# Patient Record
Sex: MALE | Race: WHITE | Employment: OTHER | URBAN - METROPOLITAN AREA
[De-identification: names, ages, dates, MRNs, and addresses within clinical notes are randomized per-mention and may not be internally consistent; named-entity substitution may affect disease eponyms.]

---

## 2019-06-06 ENCOUNTER — HOSPITAL ENCOUNTER (OUTPATIENT)
Dept: GENERAL RADIOLOGY | Age: 84
Discharge: HOME | End: 2019-06-08
Payer: MEDICARE

## 2019-06-06 DIAGNOSIS — R91.8 LUNG INFILTRATE: ICD-10-CM

## 2019-06-06 PROCEDURE — 71046 X-RAY EXAM CHEST 2 VIEWS: CPT

## 2019-12-28 ENCOUNTER — APPOINTMENT (OUTPATIENT)
Dept: GENERAL RADIOLOGY | Age: 84
DRG: 683 | End: 2019-12-28
Payer: MEDICARE

## 2019-12-28 ENCOUNTER — APPOINTMENT (OUTPATIENT)
Dept: CT IMAGING | Age: 84
DRG: 683 | End: 2019-12-28
Payer: MEDICARE

## 2019-12-28 ENCOUNTER — APPOINTMENT (OUTPATIENT)
Dept: ULTRASOUND IMAGING | Age: 84
DRG: 683 | End: 2019-12-28
Payer: MEDICARE

## 2019-12-28 ENCOUNTER — HOSPITAL ENCOUNTER (INPATIENT)
Age: 84
LOS: 3 days | Discharge: NURSING, REHAB OR LONG-TERM CARE | DRG: 683 | End: 2019-12-31
Attending: INTERNAL MEDICINE | Admitting: INTERNAL MEDICINE
Payer: MEDICARE

## 2019-12-28 PROBLEM — N17.9 ACUTE RENAL FAILURE (ARF) (HCC): Status: ACTIVE | Noted: 2019-12-28

## 2019-12-28 LAB
ABO/RH: NORMAL
ALBUMIN SERPL-MCNC: 4.2 G/DL (ref 3.5–4.6)
ALP BLD-CCNC: 117 U/L (ref 35–104)
ALT SERPL-CCNC: 28 U/L (ref 0–41)
ANION GAP SERPL CALCULATED.3IONS-SCNC: 18 MEQ/L (ref 9–15)
ANTIBODY SCREEN: NORMAL
APTT: 29.8 SEC (ref 24.4–36.8)
AST SERPL-CCNC: 41 U/L (ref 0–40)
BACTERIA: NEGATIVE /HPF
BASOPHILS ABSOLUTE: 0.1 K/UL (ref 0–0.2)
BASOPHILS RELATIVE PERCENT: 0.4 %
BILIRUB SERPL-MCNC: 0.6 MG/DL (ref 0.2–0.7)
BILIRUBIN URINE: NEGATIVE
BLOOD, URINE: ABNORMAL
BUN BLDV-MCNC: 39 MG/DL (ref 8–23)
CALCIUM SERPL-MCNC: 9.4 MG/DL (ref 8.5–9.9)
CHLORIDE BLD-SCNC: 100 MEQ/L (ref 95–107)
CLARITY: CLEAR
CO2: 20 MEQ/L (ref 20–31)
COLOR: YELLOW
CREAT SERPL-MCNC: 1.34 MG/DL (ref 0.7–1.2)
EKG ATRIAL RATE: 147 BPM
EKG ATRIAL RATE: 166 BPM
EKG ATRIAL RATE: 47 BPM
EKG ATRIAL RATE: 63 BPM
EKG P AXIS: 48 DEGREES
EKG P-R INTERVAL: 224 MS
EKG Q-T INTERVAL: 300 MS
EKG Q-T INTERVAL: 316 MS
EKG Q-T INTERVAL: 334 MS
EKG Q-T INTERVAL: 422 MS
EKG QRS DURATION: 104 MS
EKG QRS DURATION: 108 MS
EKG QRS DURATION: 164 MS
EKG QRS DURATION: 92 MS
EKG QTC CALCULATION (BAZETT): 431 MS
EKG QTC CALCULATION (BAZETT): 467 MS
EKG QTC CALCULATION (BAZETT): 509 MS
EKG QTC CALCULATION (BAZETT): 522 MS
EKG R AXIS: -26 DEGREES
EKG R AXIS: -41 DEGREES
EKG R AXIS: -46 DEGREES
EKG R AXIS: -49 DEGREES
EKG T AXIS: 117 DEGREES
EKG T AXIS: 128 DEGREES
EKG T AXIS: 49 DEGREES
EKG T AXIS: 52 DEGREES
EKG VENTRICULAR RATE: 146 BPM
EKG VENTRICULAR RATE: 147 BPM
EKG VENTRICULAR RATE: 156 BPM
EKG VENTRICULAR RATE: 63 BPM
EOSINOPHILS ABSOLUTE: 0 K/UL (ref 0–0.7)
EOSINOPHILS RELATIVE PERCENT: 0.1 %
EPITHELIAL CELLS, UA: ABNORMAL /HPF (ref 0–5)
GFR AFRICAN AMERICAN: 59.7
GFR NON-AFRICAN AMERICAN: 49.3
GLOBULIN: 3.8 G/DL (ref 2.3–3.5)
GLUCOSE BLD-MCNC: 155 MG/DL (ref 70–99)
GLUCOSE URINE: NEGATIVE MG/DL
HCT VFR BLD CALC: 30.8 % (ref 42–52)
HEMOGLOBIN: 10.5 G/DL (ref 14–18)
HYALINE CASTS: ABNORMAL /HPF (ref 0–5)
INR BLD: 1.2
KETONES, URINE: NEGATIVE MG/DL
LACTIC ACID: 2.7 MMOL/L (ref 0.5–2.2)
LEUKOCYTE ESTERASE, URINE: ABNORMAL
LYMPHOCYTES ABSOLUTE: 0.9 K/UL (ref 1–4.8)
LYMPHOCYTES RELATIVE PERCENT: 5.3 %
MAGNESIUM: 2 MG/DL (ref 1.7–2.4)
MCH RBC QN AUTO: 35 PG (ref 27–31.3)
MCHC RBC AUTO-ENTMCNC: 34 % (ref 33–37)
MCV RBC AUTO: 102.9 FL (ref 80–100)
MONOCYTES ABSOLUTE: 0.9 K/UL (ref 0.2–0.8)
MONOCYTES RELATIVE PERCENT: 5.1 %
NEUTROPHILS ABSOLUTE: 15.2 K/UL (ref 1.4–6.5)
NEUTROPHILS RELATIVE PERCENT: 89.1 %
NITRITE, URINE: POSITIVE
PDW BLD-RTO: 14.4 % (ref 11.5–14.5)
PH UA: 5 (ref 5–9)
PLATELET # BLD: 236 K/UL (ref 130–400)
POTASSIUM SERPL-SCNC: 4.3 MEQ/L (ref 3.4–4.9)
PROTEIN UA: 30 MG/DL
PROTHROMBIN TIME: 15.2 SEC (ref 12.3–14.9)
RBC # BLD: 2.99 M/UL (ref 4.7–6.1)
SODIUM BLD-SCNC: 138 MEQ/L (ref 135–144)
SPECIFIC GRAVITY UA: 1.02 (ref 1–1.03)
TOTAL PROTEIN: 8 G/DL (ref 6.3–8)
TROPONIN: 0.05 NG/ML (ref 0–0.01)
TSH SERPL DL<=0.05 MIU/L-ACNC: 2.46 UIU/ML (ref 0.44–3.86)
URINE REFLEX TO CULTURE: YES
UROBILINOGEN, URINE: 0.2 E.U./DL
WBC # BLD: 17.1 K/UL (ref 4.8–10.8)
WBC UA: ABNORMAL /HPF (ref 0–5)
YEAST: PRESENT

## 2019-12-28 PROCEDURE — 2580000003 HC RX 258: Performed by: PHYSICIAN ASSISTANT

## 2019-12-28 PROCEDURE — 80053 COMPREHEN METABOLIC PANEL: CPT

## 2019-12-28 PROCEDURE — 85025 COMPLETE CBC W/AUTO DIFF WBC: CPT

## 2019-12-28 PROCEDURE — 72131 CT LUMBAR SPINE W/O DYE: CPT

## 2019-12-28 PROCEDURE — 6830039000 HC L3 TRAUMA ALERT

## 2019-12-28 PROCEDURE — 81001 URINALYSIS AUTO W/SCOPE: CPT

## 2019-12-28 PROCEDURE — 86900 BLOOD TYPING SEROLOGIC ABO: CPT

## 2019-12-28 PROCEDURE — 93005 ELECTROCARDIOGRAM TRACING: CPT | Performed by: EMERGENCY MEDICINE

## 2019-12-28 PROCEDURE — 70450 CT HEAD/BRAIN W/O DYE: CPT

## 2019-12-28 PROCEDURE — 2580000003 HC RX 258: Performed by: PERSONAL EMERGENCY RESPONSE ATTENDANT

## 2019-12-28 PROCEDURE — 86901 BLOOD TYPING SEROLOGIC RH(D): CPT

## 2019-12-28 PROCEDURE — 84443 ASSAY THYROID STIM HORMONE: CPT

## 2019-12-28 PROCEDURE — 2580000003 HC RX 258: Performed by: INTERNAL MEDICINE

## 2019-12-28 PROCEDURE — 72125 CT NECK SPINE W/O DYE: CPT

## 2019-12-28 PROCEDURE — 83735 ASSAY OF MAGNESIUM: CPT

## 2019-12-28 PROCEDURE — 6360000002 HC RX W HCPCS: Performed by: PHYSICIAN ASSISTANT

## 2019-12-28 PROCEDURE — 85730 THROMBOPLASTIN TIME PARTIAL: CPT

## 2019-12-28 PROCEDURE — 6360000002 HC RX W HCPCS: Performed by: INTERNAL MEDICINE

## 2019-12-28 PROCEDURE — 99285 EMERGENCY DEPT VISIT HI MDM: CPT

## 2019-12-28 PROCEDURE — 2500000003 HC RX 250 WO HCPCS: Performed by: PERSONAL EMERGENCY RESPONSE ATTENDANT

## 2019-12-28 PROCEDURE — 6360000004 HC RX CONTRAST MEDICATION: Performed by: PERSONAL EMERGENCY RESPONSE ATTENDANT

## 2019-12-28 PROCEDURE — 85610 PROTHROMBIN TIME: CPT

## 2019-12-28 PROCEDURE — 74177 CT ABD & PELVIS W/CONTRAST: CPT

## 2019-12-28 PROCEDURE — 93005 ELECTROCARDIOGRAM TRACING: CPT | Performed by: PERSONAL EMERGENCY RESPONSE ATTENDANT

## 2019-12-28 PROCEDURE — 72128 CT CHEST SPINE W/O DYE: CPT

## 2019-12-28 PROCEDURE — 96375 TX/PRO/DX INJ NEW DRUG ADDON: CPT

## 2019-12-28 PROCEDURE — 2000000000 HC ICU R&B

## 2019-12-28 PROCEDURE — 2580000003 HC RX 258

## 2019-12-28 PROCEDURE — 71275 CT ANGIOGRAPHY CHEST: CPT

## 2019-12-28 PROCEDURE — 87086 URINE CULTURE/COLONY COUNT: CPT

## 2019-12-28 PROCEDURE — 96365 THER/PROPH/DIAG IV INF INIT: CPT

## 2019-12-28 PROCEDURE — 96361 HYDRATE IV INFUSION ADD-ON: CPT

## 2019-12-28 PROCEDURE — 71045 X-RAY EXAM CHEST 1 VIEW: CPT

## 2019-12-28 PROCEDURE — 2580000003 HC RX 258: Performed by: EMERGENCY MEDICINE

## 2019-12-28 PROCEDURE — 96376 TX/PRO/DX INJ SAME DRUG ADON: CPT

## 2019-12-28 PROCEDURE — 83605 ASSAY OF LACTIC ACID: CPT

## 2019-12-28 PROCEDURE — 86850 RBC ANTIBODY SCREEN: CPT

## 2019-12-28 PROCEDURE — 99223 1ST HOSP IP/OBS HIGH 75: CPT | Performed by: SURGERY

## 2019-12-28 PROCEDURE — 84484 ASSAY OF TROPONIN QUANT: CPT

## 2019-12-28 PROCEDURE — 6360000002 HC RX W HCPCS: Performed by: PERSONAL EMERGENCY RESPONSE ATTENDANT

## 2019-12-28 PROCEDURE — 36415 COLL VENOUS BLD VENIPUNCTURE: CPT

## 2019-12-28 PROCEDURE — 72170 X-RAY EXAM OF PELVIS: CPT

## 2019-12-28 RX ORDER — SODIUM CHLORIDE 9 MG/ML
INJECTION, SOLUTION INTRAVENOUS CONTINUOUS
Status: DISCONTINUED | OUTPATIENT
Start: 2019-12-28 | End: 2019-12-30

## 2019-12-28 RX ORDER — METOPROLOL TARTRATE 5 MG/5ML
5 INJECTION INTRAVENOUS ONCE
Status: COMPLETED | OUTPATIENT
Start: 2019-12-28 | End: 2019-12-28

## 2019-12-28 RX ORDER — ONDANSETRON 2 MG/ML
4 INJECTION INTRAMUSCULAR; INTRAVENOUS ONCE
Status: COMPLETED | OUTPATIENT
Start: 2019-12-28 | End: 2019-12-28

## 2019-12-28 RX ORDER — 0.9 % SODIUM CHLORIDE 0.9 %
1000 INTRAVENOUS SOLUTION INTRAVENOUS ONCE
Status: COMPLETED | OUTPATIENT
Start: 2019-12-28 | End: 2019-12-28

## 2019-12-28 RX ORDER — 0.9 % SODIUM CHLORIDE 0.9 %
30 INTRAVENOUS SOLUTION INTRAVENOUS ONCE
Status: COMPLETED | OUTPATIENT
Start: 2019-12-28 | End: 2019-12-28

## 2019-12-28 RX ORDER — LEVOTHYROXINE SODIUM 0.1 MG/1
100 TABLET ORAL DAILY
COMMUNITY

## 2019-12-28 RX ORDER — ADENOSINE 3 MG/ML
6 INJECTION, SOLUTION INTRAVENOUS ONCE
Status: COMPLETED | OUTPATIENT
Start: 2019-12-28 | End: 2019-12-28

## 2019-12-28 RX ORDER — FUROSEMIDE 10 MG/ML
60 INJECTION INTRAMUSCULAR; INTRAVENOUS ONCE
Status: DISCONTINUED | OUTPATIENT
Start: 2019-12-28 | End: 2019-12-29

## 2019-12-28 RX ORDER — 0.9 % SODIUM CHLORIDE 0.9 %
500 INTRAVENOUS SOLUTION INTRAVENOUS ONCE
Status: COMPLETED | OUTPATIENT
Start: 2019-12-28 | End: 2019-12-28

## 2019-12-28 RX ORDER — LORAZEPAM 2 MG/ML
1 INJECTION INTRAMUSCULAR ONCE
Status: COMPLETED | OUTPATIENT
Start: 2019-12-28 | End: 2019-12-28

## 2019-12-28 RX ORDER — FENTANYL CITRATE 50 UG/ML
50 INJECTION, SOLUTION INTRAMUSCULAR; INTRAVENOUS ONCE
Status: COMPLETED | OUTPATIENT
Start: 2019-12-28 | End: 2019-12-28

## 2019-12-28 RX ORDER — LEVOTHYROXINE SODIUM 0.1 MG/1
100 TABLET ORAL DAILY
Status: DISCONTINUED | OUTPATIENT
Start: 2019-12-28 | End: 2019-12-31 | Stop reason: HOSPADM

## 2019-12-28 RX ORDER — SODIUM CHLORIDE 0.9 % (FLUSH) 0.9 %
10 SYRINGE (ML) INJECTION EVERY 12 HOURS SCHEDULED
Status: DISCONTINUED | OUTPATIENT
Start: 2019-12-28 | End: 2019-12-31 | Stop reason: HOSPADM

## 2019-12-28 RX ORDER — ONDANSETRON 2 MG/ML
4 INJECTION INTRAMUSCULAR; INTRAVENOUS EVERY 6 HOURS PRN
Status: DISCONTINUED | OUTPATIENT
Start: 2019-12-28 | End: 2019-12-31 | Stop reason: HOSPADM

## 2019-12-28 RX ORDER — SODIUM CHLORIDE 0.9 % (FLUSH) 0.9 %
10 SYRINGE (ML) INJECTION PRN
Status: DISCONTINUED | OUTPATIENT
Start: 2019-12-28 | End: 2019-12-31 | Stop reason: HOSPADM

## 2019-12-28 RX ORDER — MAGNESIUM HYDROXIDE 1200 MG/15ML
LIQUID ORAL
Status: COMPLETED
Start: 2019-12-28 | End: 2019-12-28

## 2019-12-28 RX ORDER — VALSARTAN 80 MG/1
80 TABLET ORAL DAILY
Status: ON HOLD | COMMUNITY
End: 2020-01-03 | Stop reason: HOSPADM

## 2019-12-28 RX ORDER — 0.9 % SODIUM CHLORIDE 0.9 %
1000 INTRAVENOUS SOLUTION INTRAVENOUS ONCE
Status: DISCONTINUED | OUTPATIENT
Start: 2019-12-28 | End: 2019-12-31 | Stop reason: HOSPADM

## 2019-12-28 RX ADMIN — ADENOSINE 6 MG: 3 INJECTION INTRAVENOUS at 04:41

## 2019-12-28 RX ADMIN — ENOXAPARIN SODIUM 30 MG: 30 INJECTION SUBCUTANEOUS at 14:31

## 2019-12-28 RX ADMIN — SODIUM CHLORIDE: 9 INJECTION, SOLUTION INTRAVENOUS at 12:00

## 2019-12-28 RX ADMIN — ADENOSINE 6 MG: 3 INJECTION, SOLUTION INTRAVENOUS at 03:53

## 2019-12-28 RX ADMIN — LORAZEPAM 1 MG: 2 INJECTION INTRAMUSCULAR; INTRAVENOUS at 04:58

## 2019-12-28 RX ADMIN — SODIUM CHLORIDE 500 ML: 9 INJECTION, SOLUTION INTRAVENOUS at 08:22

## 2019-12-28 RX ADMIN — SODIUM CHLORIDE 2040 ML: 9 INJECTION, SOLUTION INTRAVENOUS at 09:53

## 2019-12-28 RX ADMIN — METOPROLOL TARTRATE 5 MG: 5 INJECTION INTRAVENOUS at 04:58

## 2019-12-28 RX ADMIN — CEFTRIAXONE SODIUM 1 G: 1 INJECTION, POWDER, FOR SOLUTION INTRAMUSCULAR; INTRAVENOUS at 08:46

## 2019-12-28 RX ADMIN — ONDANSETRON 4 MG: 2 INJECTION INTRAMUSCULAR; INTRAVENOUS at 04:09

## 2019-12-28 RX ADMIN — IOPAMIDOL 100 ML: 755 INJECTION, SOLUTION INTRAVENOUS at 06:03

## 2019-12-28 RX ADMIN — SODIUM CHLORIDE 1000 ML: 9 INJECTION, SOLUTION INTRAVENOUS at 05:19

## 2019-12-28 RX ADMIN — SODIUM CHLORIDE 1000 ML: 9 INJECTION, SOLUTION INTRAVENOUS at 03:53

## 2019-12-28 RX ADMIN — SODIUM CHLORIDE 1000 ML: 900 IRRIGANT IRRIGATION at 21:50

## 2019-12-28 RX ADMIN — Medication 10 ML: at 21:17

## 2019-12-28 RX ADMIN — FENTANYL CITRATE 50 MCG: 50 INJECTION, SOLUTION INTRAMUSCULAR; INTRAVENOUS at 04:09

## 2019-12-28 ASSESSMENT — ENCOUNTER SYMPTOMS
SORE THROAT: 0
CHEST TIGHTNESS: 0
COUGH: 0
ABDOMINAL PAIN: 0
SHORTNESS OF BREATH: 0
BLOOD IN STOOL: 0
VOMITING: 0
BACK PAIN: 1
ABDOMINAL DISTENTION: 0
COLOR CHANGE: 0
RHINORRHEA: 0
APNEA: 0
DIARRHEA: 0
NAUSEA: 0

## 2019-12-28 ASSESSMENT — PAIN SCALES - GENERAL
PAINLEVEL_OUTOF10: 0
PAINLEVEL_OUTOF10: 10
PAINLEVEL_OUTOF10: 0
PAINLEVEL_OUTOF10: 0

## 2019-12-28 NOTE — H&P
Department of Internal Medicine  Nephrology  [facility]  Attending History and Physical      CHIEF COMPLAINT:  fall    Reason for Admission:  Fabrice, fall    History Obtained From:  patient, family member - son Gena Means    HISTORY OF PRESENT ILLNESS:    80y.o. year old male with history s/f hypothyroid, head and neck cancer, htn. Recent cancer by left ear seen at [facility]. Required surgery and radiation. Sleeps 22 hours a day per family (has been like that for couple years). Has living will. Designates his son Gena Means as health care agent. Son says he would not want CPR, etc if heart stopped. In ER was evaluated and cleared by trauma. Thought to have uti but wbc in urine is low. Got ivf for fabrice and low bp. Was in SVT. Given adenosine x 2. Past Medical History:        Diagnosis Date    Cancer [facility])     skin CA left ear    Hypertension        Past Surgical History:    History reviewed. No pertinent surgical history. Medications Prior to Admission:    Not in a hospital admission. Allergies:  Aripiprazole    Social History:   No tobacco.  Lives with son. Family History:   History reviewed. No pertinent family history.     REVIEW OF SYSTEMS:  Positives in bold  Constitutional: fever, chills, fatigue, malaise   HENT:  rhinorrhea, sinus pain, sore throat, epistaxis    Eyes:  photophobia, visual disturbance, eye redness  Respiratory: shortness of breath, cough, hemoptysis    Cardiovascular: chest pain, palpitations, orthopnea, leg swelling   Gastrointestinal: abdominal pain, nausea, vomiting, diarrhea, constipation   Endocrine: polydipsia, polyphagia, cold intolerance, heat intolerance  Genitourinary: dysuria, flank pain, frequency, urgency   Hematologic: easy bruising, easy bleeding  Musculoskeletal: back pain, neck pain, myalgias, arthalgias  Neurological: syncope, lightheadedness, dizziness, seizures, tremors, weakness  Psychiatric/Behavioral: anxiety, depression, hallucinations  Skin: rash, itching    PHYSICAL EXAM:  Vitals:  BP 94/60   Pulse 65   Temp 99 °F (37.2 °C) (Temporal)   Resp 20   Ht 5' 9\" (1.753 m)   Wt 150 lb (68 kg)   SpO2 100%   BMI 22.15 kg/m²     General: sleeping.     HEENT: has deformities from left ear surgery and radiation  Neck: supple, no mass  Lungs: non-labored respirations, clear to auscultation bilaterally  Heart: regular rate and rhythm, no murmurs or rubs  Abdomen: soft, non-tender, non-distended  MSK: no joint swelling or tenderness  Ext: no cyanosis, no peripheral edema  Neuro: lethargic  Psych: unable to assess  Skin: no rash    DATA:  CBC with Differential:    Lab Results   Component Value Date    WBC 17.1 12/28/2019    RBC 2.99 12/28/2019    RBC 3.76 12/27/2011    HGB 10.5 12/28/2019    HCT 30.8 12/28/2019     12/28/2019    .9 12/28/2019    MCH 35.0 12/28/2019    MCHC 34.0 12/28/2019    RDW 14.4 12/28/2019    LYMPHOPCT 5.3 12/28/2019    MONOPCT 5.1 12/28/2019    BASOPCT 0.4 12/28/2019    MONOSABS 0.9 12/28/2019    LYMPHSABS 0.9 12/28/2019    EOSABS 0.0 12/28/2019    BASOSABS 0.1 12/28/2019     CMP:    Lab Results   Component Value Date     12/28/2019    K 4.3 12/28/2019     12/28/2019    CO2 20 12/28/2019    BUN 39 12/28/2019    CREATININE 1.34 12/28/2019    GFRAA 59.7 12/28/2019    LABGLOM 49.3 12/28/2019    GLUCOSE 155 12/28/2019    GLUCOSE 99 12/27/2011    PROT 8.0 12/28/2019    LABALBU 4.2 12/28/2019    LABALBU 4.3 12/27/2011    CALCIUM 9.4 12/28/2019    BILITOT 0.6 12/28/2019    ALKPHOS 117 12/28/2019    AST 41 12/28/2019    ALT 28 12/28/2019     Phosphorus:  No results found for: PHOS  Last 3 Troponin:    Lab Results   Component Value Date    TROPONINI 0.047 12/28/2019     U/A:    Lab Results   Component Value Date    COLORU Yellow 12/28/2019    PROTEINU 30 12/28/2019    PHUR 5.0 12/28/2019    WBCUA 0-2 12/28/2019    YEAST Present 12/28/2019    BACTERIA Negative 12/28/2019    CLARITYU Clear 12/28/2019    SPECGRAV 1.017 12/28/2019    LEUKOCYTESUR TRACE 12/28/2019    UROBILINOGEN 0.2 12/28/2019    BILIRUBINUR Negative 12/28/2019    BLOODU SMALL 12/28/2019    GLUCOSEU Negative 12/28/2019       ASSESSMENT AND PLAN:    79 yo man with head and neck cancer, htn, hypothyroid admitted with fall. Has high wbc in blood (17k). Possible but unlikely uti. Has melida and SVT. - DNR CCA  - cardiology and critical care consults  - ivf at 75 cc/hr  - abx . F/u cx's.   Got ceftriaxone in er  - cleared by trauma  - d/w family    Bk Hanks MD

## 2019-12-28 NOTE — ED NOTES
XR at bedside. 25 minutes needed to get pt in position for Xrays ordered. Pt resistant to repositioning. States \"Leave me alone\", \"you've got me all screwed up\" repetatively. States \"just let me sleep\". Upon XR completion, while attempting to obtain repeat EKG, pt heart rate noted to be back up to 140s. BARRIE Brown noted. EKG completed.        Prasanna Victor RN  12/28/19 [id number]

## 2019-12-28 NOTE — CONSULTS
Trauma Consult / H & P Note    Reason for Consult: Trauma  Consulting Provider: BARRIE Hall      BASIC INJURY INFORMATION:  Level of activation: CAT 2  Mode of transport: EMS  Mechanism of injury: Fall from Standing  Complicating features: NA  Protective measures: NA    HISTORY OF PRESENT INJURY:   Edd Vidal is a 80 y.o. male who presents after fall. The patient lives with his son and his son heard the patient yelling his name for help around 0200. He did not hear the patient fall but found him on the floor in the middle of the dining room. Per the 2 sons, the patient sleeps about 23 hours a day and sometimes gets up in the middle of the night to eat. He doesn't have any recent falls other than about 8 months ago. He lives with his son and is able to write checks. Is normally alert and converses appropriately. Per son, the patient was complaining of hip and low back pain. Here in ED, the patient is not oriented and is agitated when moved or uncovered for exam. He was in SVT on presentation and was given adenosine by ED staff.        PRIMARY SURVEY:  Airway: Intact  Breathing: Normal   Breath Sounds: Breath Sounds Equal Bilaterally  Circulation:    Pulses: Normal   Skin: Normal skin color, texture and turgor and No rashes or lesions  Disability:   Pupils: PERRL   GCS:    Best Eyes: 4    Best Verbal: 4    Best Motor: 6    Total: 14    Vitals:   Vitals:    12/28/19 0500 12/28/19 0505 12/28/19 0514 12/28/19 0630   BP: (!) 66/57 (!) 89/57 (!) 80/56 (!) 101/58   Pulse: 152 64 59 65   Resp:    16   Temp:       TempSrc:       SpO2:  97% 98% 100%   Weight:       Height:             SECONDARY SURVEY:  Neurologic: Moves all Extremities, Strength Symmetrical and No Sensory Deficits , not oriented to person, place or time  HEENT:   Head: No lacerations, bony step-offs, or abrasions and Midface stable to palpation; asymmetry of right and left temporal region with scar noted   Eyes: right eye closed and left eye status: None   Substance and Sexual Activity    Alcohol use: None    Drug use: Never    Sexual activity: Never   Lifestyle    Physical activity:     Days per week: None     Minutes per session: None    Stress: None   Relationships    Social connections:     Talks on phone: None     Gets together: None     Attends Hinduism service: None     Active member of club or organization: None     Attends meetings of clubs or organizations: None     Relationship status: None    Intimate partner violence:     Fear of current or ex partner: None     Emotionally abused: None     Physically abused: None     Forced sexual activity: None   Other Topics Concern    None   Social History Narrative    None       FAMILY HISTORY:  No history of bleeding or clotting disorders      REVIEW OF SYSTEMS:    Unable to obtain secondary to mental status  But per sons, the patient has been of normal health recently    BASIC LABS:  CBC with Differential:    Lab Results   Component Value Date    WBC 17.1 12/28/2019    RBC 2.99 12/28/2019    RBC 3.76 12/27/2011    HGB 10.5 12/28/2019    HCT 30.8 12/28/2019     12/28/2019    .9 12/28/2019    MCH 35.0 12/28/2019    MCHC 34.0 12/28/2019    RDW 14.4 12/28/2019    LYMPHOPCT 5.3 12/28/2019    MONOPCT 5.1 12/28/2019    BASOPCT 0.4 12/28/2019    MONOSABS 0.9 12/28/2019    LYMPHSABS 0.9 12/28/2019    EOSABS 0.0 12/28/2019    BASOSABS 0.1 12/28/2019     CMP:   Lab Results   Component Value Date     12/28/2019    K 4.3 12/28/2019     12/28/2019    CO2 20 12/28/2019    BUN 39 (H) 12/28/2019    CREATININE 1.34 (H) 12/28/2019    GLUCOSE 155 (H) 12/28/2019    CALCIUM 9.4 12/28/2019    PROT 8.0 12/28/2019    LABALBU 4.2 12/28/2019    BILITOT 0.6 12/28/2019    ALKPHOS 117 (H) 12/28/2019    AST 41 (H) 12/28/2019    ALT 28 12/28/2019    LABGLOM 49.3 (L) 12/28/2019    GFRAA 59.7 (L) 12/28/2019    GLOB 3.8 (H) 12/28/2019     Magnesium:   Lab Results   Component Value Date    MG 2.0 12/28/2019     Troponin:   Lab Results   Component Value Date    TROPONINI 0.047 12/28/2019     PT/INR:   Recent Labs     12/28/19  0330   PROTIME 15.2*   INR 1.2     APTT:   Recent Labs     12/28/19  0330   APTT 29.8     EtOH: No results found for: ETOH    RADIOLOGY:  CXR: borderline mediastinum, prominent hilar vasculature; no ptx    PELVIS XR: no obvious fx    CT HEAD: no intracranial hemorrhage or skull fracture    CT C-Spine: no acute fracture    CT RECONS: no fracture of t/l spine    CT CA/P: no traumatic injury, no fracture, no solid organ injury, no ptx      ASSESSMENT/PLAN:  Freddy Muñoz is a 80 y.o. male who presents after fall with altered mental status and SVT requiring adenosine x 2. Trauma workup found no acute traumatic injuries. No indication for further trauma workup or admission. Dispo per ED.       Donald Carty MD  Trauma and Emergency General Surgery

## 2019-12-28 NOTE — ED PROVIDER NOTES
Effort: Pulmonary effort is normal. No respiratory distress. Breath sounds: Normal breath sounds. No stridor. Abdominal:      General: Bowel sounds are normal. There is no distension. Palpations: Abdomen is soft. There is no mass. Tenderness: There is tenderness. There is no guarding or rebound. Comments: Patient does seem to have mild upper abdominal tenderness to palpation, abdomen soft nondistended, no rebound rigidity, no pulsatile mass or bruit, no ecchymosis   Musculoskeletal: Normal range of motion. Comments: No leg shortening or rotation, patient does seem to have right hip pain. Pelvis appears stable. MSP intact distally. Able to flex legs on own. Skin:     General: Skin is warm and dry. Capillary Refill: Capillary refill takes less than 2 seconds. Findings: No rash. Neurological:      Mental Status: He is alert and oriented to person, place, and time. Deep Tendon Reflexes: Reflexes are normal and symmetric. Psychiatric:         Behavior: Behavior normal.         Thought Content:  Thought content normal.         Judgment: Judgment normal.         DIAGNOSTIC RESULTS     EKG:All EKG's are interpreted by the Emergency Department Physician who either signs or Co-signs this chart in the absence of a cardiologist.    EKG #1: EKG shows sinus rhythm with right bundle branch block, heart rate 156, normal intervals, no ST segment changes    EKG#2: EKG shows SVT with incomplete right bundle branch block, heart rate 146, normal intervals, no ST segment changes    EKG#3: Sinus rhythm with first-degree AV block, heart rate 63, AL interval 224 MS, normal intervals, no ST segment changes    RADIOLOGY:   Non-plain film images such as CT, Ultrasound and MRI are read by theradiologist. Plain radiographic images are visualized and preliminarily interpreted by the emergency physician with the below findings:    CT of the head shows no acute intracranial hemorrhage, no midline shift, no mass. No skull fracture. CT of the cervical spine shows no acute evidence for fracture or malalignment    CT of the lumbar spine shows no evidence for fracture or malalignment. CT of the thoracic spine no evidence for fracture or malalignment    CTA of the chest shows scattered groundglass attenuation most consistent with sub-segmental atelectasis no focal consolidation, no pleural effusions, no  Pneumothorax    CT of the abdomen pelvis shows no significant acute traumatic injury involving the abdomen pelvis. No solid organ injury. Interpretation per theRadiologist below, if available at the time of this note:    CT Head WO Contrast   Final Result   1. No CT evidence of acute intracranial abnormality, as questioned. 2. Limited evaluation secondary to metallic and streak artifact from the metallic object along the left anterior orbit/optic globe. 3. Encephalomalacic changes noted in the left temporal lobe, no comparison studies are available, clinical correlation recommended. The possibility this reflects a cystic intraparenchymal lesion is felt less likely but not completely excluded,    postoperative changes noted at the left temporal bone and most proximal temporal calvarium. 4. Moderate to moderately severe cerebral volume loss/atrophy. 5. Left sphenoid sinus mucosal thickening.       XR CHEST PORTABLE    (Results Pending)   XR PELVIS (1-2 VIEWS)    (Results Pending)   CT CERVICAL SPINE WO CONTRAST    (Results Pending)   CT LUMBAR SPINE WO CONTRAST    (Results Pending)   CT ABDOMEN PELVIS W IV CONTRAST Additional Contrast? None    (Results Pending)   CTA CHEST W WO CONTRAST    (Results Pending)   CT THORACIC SPINE WO CONTRAST    (Results Pending)           LABS:  Labs Reviewed   CBC WITH AUTO DIFFERENTIAL - Abnormal; Notable for the following components:       Result Value    WBC 17.1 (*)     RBC 2.99 (*)     Hemoglobin 10.5 (*)     Hematocrit 30.8 (*)     .9 (*)     MCH 35.0 (*)     Neutrophils Absolute 15.2 (*)     Lymphocytes Absolute 0.9 (*)     Monocytes Absolute 0.9 (*)     All other components within normal limits   PROTIME-INR - Abnormal; Notable for the following components:    Protime 15.2 (*)     All other components within normal limits   TROPONIN - Abnormal; Notable for the following components:    Troponin 0.047 (*)     All other components within normal limits    Narrative:     CALL  [place]  [facility] tel. [phone],  Troponin results called to and read back by [address] [place] [address], 12/28/2019 04:29,  by Marvin Booth   URINE RT REFLEX TO CULTURE - Abnormal; Notable for the following components:    Blood, Urine SMALL (*)     Protein, UA 30 (*)     Nitrite, Urine POSITIVE (*)     Leukocyte Esterase, Urine TRACE (*)     All other components within normal limits   COMPREHENSIVE METABOLIC PANEL - Abnormal; Notable for the following components:    Anion Gap 18 (*)     Glucose 155 (*)     BUN 39 (*)     CREATININE 1.34 (*)     GFR Non- 49.3 (*)     GFR  59.7 (*)     Alkaline Phosphatase 117 (*)     AST 41 (*)     Globulin 3.8 (*)     All other components within normal limits   MICROSCOPIC URINALYSIS - Abnormal; Notable for the following components:    Yeast, UA Present (*)     All other components within normal limits   URINE CULTURE   APTT   MAGNESIUM   TSH WITHOUT REFLEX    Narrative:     CALL  [place]son  [facility] tel. [phone],  Troponin results called to and read back by [address] [place] [address], 12/28/2019 04:29,  by Marvin Booth   LACTIC ACID, PLASMA   LACTIC ACID, PLASMA   TYPE AND SCREEN       All other labs were within normal range or not returned as of this dictation.     EMERGENCY DEPARTMENT COURSE and DIFFERENTIAL DIAGNOSIS/MDM:   Vitals:    Vitals:    12/28/19 0530 12/28/19 0600 12/28/19 0630 12/28/19 0700   BP: (!) 76/50 (!) 100/59 (!) 101/58 94/60   Pulse: 62 68 65 65   Resp:   16 20   Temp:       TempSrc:       SpO2:   100% 100%   Weight:       Height:             MDM  Number of Diagnoses or Management Options  MARY (acute kidney injury) [facility]):   Elevated troponin:   Fall, initial encounter:   Leukocytosis, unspecified type:   Paroxysmal supraventricular tachycardia [facility]):   Urinary tract infection without hematuria, site unspecified:   Diagnosis management comments: Patient was initially seen by BARRIE [facility]  throughout the evening as well as Dr. Davidson Perry of trauma services. After evaluation was completed basic labs were obtained as well as multiple CT scans CT scans did all come back negative at this time according to Dr. Pretty Sparrow patient is cleared from trauma services and can be admitted through medicine for further evaluation and management for acute fall, SVT, hydration, acute kidney injury, urinary tract infection, leukocytosis, and elevated troponin. I spoke with Dr. Amy Potts who is number for Dr. James Mcdonald he will accept admission this patient with consults to cardiology. Patient arrives and is hypotensive at 63/50 with pulse 71. There is obvious signs of trauma. Patient complains of right hip/leg pain. IV fluids are started with portable chest x-ray and pelvic x-ray ordered patient's heart rate. Patient did seem to go into SVT with heart rates 150. Patient was given adenosine 6 mg with positive response and was stable in the 90s. Blood pressure prior to adenosine being given was 95 systolic. Patient is very agitated, was incontinent of stool upon arrival and is yelling at staff to leave him alone.,  He then returned into SVT with heart rate 146.  6 mg adenosine was given at this time with a positive response with P waves noted but irregular rhythm. Lopressor 5mg IV was ordered at this time. EKG obtained #3 with patient returning to SVT in 147. Pelvic and CXR show no acute process. Pt then self converted to NSR with 1st degree AV block. CRITICAL CARE TIME   Total Critical Caretime was 38 minutes, excluding separately reportable procedures.   There was a high

## 2019-12-28 NOTE — CONSULTS
Consult Note  Patient: Charli Ramirez  Unit/Bed: IC11/IC11-01  YOB: 1923  MRN: [id number]  Acct: [de-identified]   Admitting Diagnosis: Acute renal failure (ARF) ([facility] [address] [N17.9]  Date:  12/28/2019  Hospital Day: 0      Chief Complaint:  Mechanical fall. SVT    History of Present Illness:  Admitted with mechanical fall. History of hypertension and skin cancer. He may have passed out. Family heard him yelling on the ground on his back. History of hypertension but on no treatment. No Known Allergies    Current Facility-Administered Medications   Medication Dose Route Frequency Provider Last Rate Last Dose    0.9 % sodium chloride infusion   Intravenous Continuous Delmer Sawyer MD        sodium chloride flush 0.9 % injection 10 mL  10 mL Intravenous 2 times per day Delmer Sawyer MD        sodium chloride flush 0.9 % injection 10 mL  10 mL Intravenous PRN Delmer Sawyer MD        ondansetron (ZOFRAN) injection 4 mg  4 mg Intravenous Q6H PRN Delmer Sawyer MD        enoxaparin (LOVENOX) injection 30 mg  30 mg Subcutaneous Daily Delmer Sawyer MD        0.9 % sodium chloride bolus  1,000 mL Intravenous Once Delmer Sawyer MD           PMHx:  Past Medical History:   Diagnosis Date    Cancer [facility])     skin CA left ear    Hypertension        PSHx:  History reviewed. No pertinent surgical history. Social Hx:  Social History     Socioeconomic History    Marital status:       Spouse name: None    Number of children: None    Years of education: None    Highest education level: None   Occupational History    None   Social Needs    Financial resource strain: None    Food insecurity:     Worry: None     Inability: None    Transportation needs:     Medical: None     Non-medical: None   Tobacco Use    Smoking status: None   Substance and Sexual Activity    Alcohol use: None    Drug use: Never    Sexual activity: Never   Lifestyle    Physical activity:     Days per week: None Pulmonary:      Effort: No respiratory distress. Breath sounds: No wheezing or rales. Chest:      Chest wall: No tenderness. Abdominal:      General: There is no distension. Palpations: Abdomen is soft. There is no mass. Tenderness: There is no tenderness. There is no guarding or rebound. Musculoskeletal: Normal range of motion. Lymphadenopathy:      Cervical: No cervical adenopathy. Skin:     General: Skin is warm. Neurological:      Mental Status: He is alert and oriented to person, place, and time.          LABS:  CBC:  Lab Results   Component Value Date    WBC 17.1 12/28/2019    RBC 2.99 12/28/2019    RBC 3.76 12/27/2011    HGB 10.5 12/28/2019    HCT 30.8 12/28/2019    .9 12/28/2019    MCH 35.0 12/28/2019    MCHC 34.0 12/28/2019    RDW 14.4 12/28/2019     12/28/2019    MPV 8.1 09/09/2015     CBC with Differential:   Lab Results   Component Value Date    WBC 17.1 12/28/2019    RBC 2.99 12/28/2019    RBC 3.76 12/27/2011    HGB 10.5 12/28/2019    HCT 30.8 12/28/2019     12/28/2019    .9 12/28/2019    MCH 35.0 12/28/2019    MCHC 34.0 12/28/2019    RDW 14.4 12/28/2019    LYMPHOPCT 5.3 12/28/2019    MONOPCT 5.1 12/28/2019    BASOPCT 0.4 12/28/2019    MONOSABS 0.9 12/28/2019    LYMPHSABS 0.9 12/28/2019    EOSABS 0.0 12/28/2019    BASOSABS 0.1 12/28/2019     CMP:    Lab Results   Component Value Date     12/28/2019    K 4.3 12/28/2019     12/28/2019    CO2 20 12/28/2019    BUN 39 12/28/2019    CREATININE 1.34 12/28/2019    GFRAA 59.7 12/28/2019    LABGLOM 49.3 12/28/2019    GLUCOSE 155 12/28/2019    GLUCOSE 99 12/27/2011    PROT 8.0 12/28/2019    LABALBU 4.2 12/28/2019    LABALBU 4.3 12/27/2011    CALCIUM 9.4 12/28/2019    BILITOT 0.6 12/28/2019    ALKPHOS 117 12/28/2019    AST 41 12/28/2019    ALT 28 12/28/2019     BMP:    Lab Results   Component Value Date     12/28/2019    K 4.3 12/28/2019     12/28/2019    CO2 20 12/28/2019    BUN 39 2019    LABALBU 4.2 2019    LABALBU 4.3 2011    CREATININE 1.34 2019    CALCIUM 9.4 2019    GFRAA 59.7 2019    LABGLOM 49.3 2019    GLUCOSE 155 2019    GLUCOSE 99 2011     Magnesium:    Lab Results   Component Value Date    MG 2.0 2019     Troponin:    Lab Results   Component Value Date    TROPONINI 0.047 2019       Radiology:  Xr Pelvis (1-2 Views)    Result Date: 2019  Patient MRN: [id number] : 1923 Age:  80 years Gender: Male Order Date: 2019 3:30 AM. Exam: XR PELVIS (1-2 VIEWS) Number of Views: 3 Indication:  Fall, from standing, to floor Comparison: None. Findings: Moderate degenerative changes bilateral hips. Moderate degenerative disc disease lower lumbar spine visualized. No evidence of acute fracture, lytic or blastic bony lesion or dislocation. . Osteopenia. Impression:  1. No acute fracture identified. . Moderate bilateral osteoarthritis of the hips with moderate degenerative disc disease visualized lower lumbar spine. Ct Head Wo Contrast    Result Date: 2019  Patient MRN: [id number] : 1923 Age:  80 years Order Date: 2019 3:30 AM. Gender: Male Exam: CT HEAD WO CONTRAST Number of Images: 157 Indication:   W19. Julita Lima Fall, initial encounter ICD10 Contrast dosage: None Comparison: None Findings: This examination was performed on a CT scanner with dose reduction. Technique: Low-dose CT  acquisition technique included one of following options; 1 . Automated exposure control, 2. Adjustment of MA and or KV according to patient's size or 3. Use of iterative reconstruction. Encephalomalacic changes left temporal lobe possibly postsurgical in this patient with previous surgery of the left mastoid air cells. Metallic density object overlying the left orbit/anterior optic globe. Moderate to moderately severe cerebral volume loss/atrophy. No subfalcine, transtentorial or tonsillar herniation or shift.  No intracranial hemorrhage. No extra-axial fluid collection or hematoma. No acute fracture or lytic or blastic bony lesion is identified. Paranasal sinuses and mastoid air cells are are well aerated with the exception of left sphenoid sinus which demonstrates mucosal thickening. Pituitary gland and sellar/suprasellar regions are unremarkable. No Chiari malformation. .     1. No CT evidence of acute intracranial abnormality, as questioned. 2. Limited evaluation secondary to metallic and streak artifact from the metallic object along the left anterior orbit/optic globe. 3. Encephalomalacic changes noted in the left temporal lobe, no comparison studies are available, clinical correlation recommended. The possibility this reflects a cystic intraparenchymal lesion is felt less likely but not completely excluded, postoperative changes noted at the left temporal bone and most proximal temporal calvarium. 4. Moderate to moderately severe cerebral volume loss/atrophy. 5. Left sphenoid sinus mucosal thickening. Cta Chest W Wo Contrast    Result Date: 2019  Patient MRN: [id number] : 1923 Age:  80 years Order Date: 2019 3:30 AM. Gender: Male Exam: CT ABDOMEN PELVIS W IV CONTRAST, CTA CHEST W WO CONTRAST, CT LUMBAR SPINE WO CONTRAST, CT THORACIC SPINE WO CONTRAST Number of Images: 2150 Indication:   Fall from standing, to the floor,. Contrast dosage: no contrast for the lumbar spine exam. Isovue-370, 100 mL, IV for the chest, abdomen and pelvis exam. Comparison: None. Findings: This examination was performed on a CT scanner with dose reduction. Technique: Low-dose CT  acquisition technique included one of following options; 1 . Automated exposure control, 2. Adjustment of MA and or KV according to patient's size or 3. Use of iterative reconstruction.  FINDINGS: CTA of the chest with IV contrast examination: Thoracic aorta is not evaluated secondary to the specificity of the CTA chest exam. Possible thoracic aortic dissection is not excluded. There is no evidence of pulmonary embolism to the segmental level. More distal pulmonary emboli are not excluded. Prominence of the right and left main pulmonary arteries measuring 2.7 cm on the right and 2.8 cm on the left possibly suggestive of a degree of underlying pulmonary hypertension. Descending thoracic aortic aneurysm measuring 3.4 x 3.6 cm. Moderate cardiomegaly. Osteopenia. Vascular calcifications left anterior descending coronary artery. Thyroid unremarkable. No supraclavicular, axillary, mediastinal or hilar lymphadenopathy. Visualized portions of the right and left clavicle, right and left scapula, and the visualized bilateral humerus, demonstrate no evidence of fracture. Suspected remote healed fracture of the sternal body. No new acute fracture definitively identified, correlation with physical exam recommended. Severe degenerative disc disease lower cervical spine. Visualized right and left ribs demonstrate no acute fracture or lytic or blastic bony lesion. Right lun. No infiltrate/pneumonia, pneumothorax or pleural effusion. 2. No discrete noncalcified pulmonary nodule or spiculated mass. Left lun. Patchy airspace disease left lower lobe. 2. No discrete noncalcified pulmonary nodule or spiculated mass identified. CT of the abdomen and pelvis with IV contrast examination: Moderate cardiomegaly. Periportal edema within the liver. Gallbladder unremarkable without cholelithiasis, choledocholithiasis, gallbladder sludge or pericholecystic fluid. No gallbladder distention. Thinning of the bilateral renal cortices. Areas of cortical scarring. No hydronephrosis or renal calculi. No solid mass. Right renal cyst. Esophagus, stomach, duodenum, spleen are unremarkable. Adrenal glands unremarkable. The pancreas demonstrates fatty  infiltration.  There are scattered calcifications noted overlying the pancreatic tail in a focal region, which measures approximately 1.8 x 2.3 cm, possibly reflective of a partially calcified mass. Osteopenia. Visualized right humerus, radius and ulna as well as the portions of the fingers of bilateral upper extremities, visualized right ribs, visualized left ribs, visualized scapula, visualized pedicles, lamina, transverse processes and spinous processes demonstrate no acute fracture or lytic or blastic bony lesion. Visualized sacrum, bilateral iliac wings, bilateral acetabulum, bilateral superior and inferior pubic rami, and the bilateral proximal femurs demonstrate no acute fracture or lytic or blastic bony lesion. No intramuscular mass or hematoma. Prostate grossly unremarkable. Bladder is significantly distended. There is wall thickening of the bladder, measuring up to approximately 1 cm in a relatively circumferential fashion. Focal area in the posterior right region of the bladder wall measures up to 2.7 cm. . Fecal retention within the rectum. Fecal retention scattered throughout colon. Moderate descending colon diverticulosis. No evidence of bowel obstruction. No abnormally enlarged small or large bowel. No free intraperitoneal air. No abnormally enlarged lymphadenopathy by CT size criteria. Osteopenia. Mild to moderate degenerative disc disease L2-S1 with moderate facet osteoarthropathy L3-S1. Age-indeterminate superior endplate deformity at W66 with approximately 10-15% loss of vertebral body height. No comparison studies are available. Normal sagittal alignment visualized lumbar spine. No pars interarticularis fracture defect. No perihepatic or perisplenic fluid is seen. Multiple small bilateral renal cysts. Delayed imaging sequences demonstrate there is partial opacification of the renal, ureteral or bladder areas. No large filling defect or mass is seen. No contrast extravasation outside normal renal, ureteral or bladder confines.  There are portions of the  renal, ureteral or bladder systems are not opacified and are therefore not evaluated. CT of the lumbar spine without contrast examination: Visualized portions of the right and left ribs, pedicles, lamina, transverse processes, spinous processes, visualized sacrum, and visualized iliac wings demonstrate no acute fracture or lytic or blastic bony lesion. Osteopenia. Moderate degenerative disc  disease L2-S1. Moderate bilateral neural foraminal narrowing L4-S1. No malu spinal canal stenosis identified. No pars interarticularis fracture defect. Age-indeterminate superior plate deformity/anterior wedging of the L1 vertebral body with approximately 5-10% loss of vertebral body height. Similar findings of the T12 vertebral body with approximately 10-15% loss of anterior vertebral body height. CT of the thoracic spine without contrast examination: Visualized portions of the transversalis foramina, transverse processes, pedicles, lamina, spinous processes, visualized iliac wings, visualized right ribs, visualized left ribs, visualized right and left scapula, demonstrate no acute fracture or lytic or blastic bony lesion. Severe degenerative disc disease C4-C7. Mild age indeterminate anterior wedging at what is designated as the T6 vertebral body with approximately 10% loss of vertebral body height. Normal sagittal alignment thoracic spine. Approximately 10-15% anterior wedging/superior plate deformity at what is designated as T12 vertebral body. No areas of malu spinal canal stenosis are identified. CTA of the chest: 1. No CT evidence of pulmonary embolism to the segmental level. More distal pulmonary emboli are not excluded. 2. The thoracic aorta is not evaluated secondary to the specificity of the CTA chest exam. Potential dissection not excluded. 3. Descending thoracic aortic aneurysm measuring 3.4 x 3.6 cm. 4. Moderate cardiomegaly with vascular calcifications left anterior descending coronary artery.  5. Patchy airspace disease left lower lobe suggestive for possible developing infiltrate/pneumonia. 6. Suspected remote fracture of the sternal body, clinical correlation with palpitation recommended. 7. Osteopenia. No acute fracture identified. CT of the abdomen and pelvis: 1. The bladder is significantly distended with suspected circumferential wall thickening with an area of asymmetric thickening measuring up to 2.7 cm. Findings are nonspecific and could be related to chronic cystitis or infection/inflammatory change, however, underlying mass or malignancy of the bladder is not excluded based on this study. 2. Multiple calcifications in the/within the pancreatic tail with increased focal density measured at approximately 1.8 x 2.3 cm possibly reflect 4 partially calcified pancreatic mass. Following resolution of patient's acute symptomatology, further evaluation with MRI the pancreas recommended. 3. Multiple bilateral renal cysts, a large right renal cyst anterior right renal cortex with thinning of the renal cortices bilaterally. 4. Age-indeterminate superior endplate deformity of D87 with approximately 10-15% loss of vertebral body height consistent with age-indeterminate compression fracture/anterior wedging. Osteopenia with mild to moderate degenerative disc disease L2-S1. 5. Periportal edema within the liver, nonspecific finding. CT of the lumbar spine: 1. Age-indeterminate anterior wedging of the T12 vertebral body (with approximately 10-15% loss of vertebral body height), and of the L1 vertebral body (with approximately 5-10% loss of vertebral body height). Findings are indeterminate without the benefit of comparison studies. If there is clinical concern for acute compression fracture component, MRI of the lumbar spine is recommended. 2. Moderate degenerative disc disease L2-S1 with moderate bilateral neural foraminal narrowing L4-S1.  CT of the thoracic spine: 1. Age-indeterminate anterior wedging/compression deformity T6 vertebral body (with approximately 10% loss of vertebral body incompletely characterized. C1 occipital condylar relationship is intact symmetric bilaterally. Moderately severe to severe degenerative disc disease C4-C7. The odontoid is unremarkable. The lateral masses of C1 are well seated and symmetrical on the body of C2. No abnormally enlarged lymphadenopathy definitively identified. 1. No CT evidence of acute fracture, of the cervical spine, as questioned. 2. Postoperative changes status post removal of the proximal left mandible, portion of the left skull base/mastoid air cells/temporal bones. 3. Cystic lesion/area within the left temporal lobe could be reflective of encephalomalacia are cystic lesion of the brain, clinical correlation recommended. Consider correlation with MRI brain if clinically warranted for further evaluation. 4. Mucosal disease sphenoid sinuses. 5. Thoracic aorta appears enlarged but is incompletely evaluated, with thoracic aortic vascular calcifications. 6. Moderately severe to severe degenerative disease C4-C7. Ct Thoracic Spine Wo Contrast    Result Date: 2019  Patient MRN: [id number] : 1923 Age:  80 years Order Date: 2019 3:30 AM. Gender: Male Exam: CT ABDOMEN PELVIS W IV CONTRAST, CTA CHEST W WO CONTRAST, CT LUMBAR SPINE WO CONTRAST, CT THORACIC SPINE WO CONTRAST Number of Images: [id number] Indication:   Fall from standing, to the floor,. Contrast dosage: no contrast for the lumbar spine exam. Isovue-370, 100 mL, IV for the chest, abdomen and pelvis exam. Comparison: None. Findings: This examination was performed on a CT scanner with dose reduction. Technique: Low-dose CT  acquisition technique included one of following options; 1 . Automated exposure control, 2. Adjustment of MA and or KV according to patient's size or 3. Use of iterative reconstruction.  FINDINGS: CTA of the chest with IV contrast examination: Thoracic aorta is not evaluated secondary to the specificity of the CTA chest exam. Possible thoracic aortic the lumbar spine without contrast examination: Visualized portions of the right and left ribs, pedicles, lamina, transverse processes, spinous processes, visualized sacrum, and visualized iliac wings demonstrate no acute fracture or lytic or blastic bony lesion. Osteopenia. Moderate degenerative disc  disease L2-S1. Moderate bilateral neural foraminal narrowing L4-S1. No malu spinal canal stenosis identified. No pars interarticularis fracture defect. Age-indeterminate superior plate deformity/anterior wedging of the L1 vertebral body with approximately 5-10% loss of vertebral body height. Similar findings of the T12 vertebral body with approximately 10-15% loss of anterior vertebral body height. CT of the thoracic spine without contrast examination: Visualized portions of the transversalis foramina, transverse processes, pedicles, lamina, spinous processes, visualized iliac wings, visualized right ribs, visualized left ribs, visualized right and left scapula, demonstrate no acute fracture or lytic or blastic bony lesion. Severe degenerative disc disease C4-C7. Mild age indeterminate anterior wedging at what is designated as the T6 vertebral body with approximately 10% loss of vertebral body height. Normal sagittal alignment thoracic spine. Approximately 10-15% anterior wedging/superior plate deformity at what is designated as T12 vertebral body. No areas of malu spinal canal stenosis are identified. CTA of the chest: 1. No CT evidence of pulmonary embolism to the segmental level. More distal pulmonary emboli are not excluded. 2. The thoracic aorta is not evaluated secondary to the specificity of the CTA chest exam. Potential dissection not excluded. 3. Descending thoracic aortic aneurysm measuring 3.4 x 3.6 cm. 4. Moderate cardiomegaly with vascular calcifications left anterior descending coronary artery. 5. Patchy airspace disease left lower lobe suggestive for possible developing infiltrate/pneumonia.  6. Suspected remote fracture of the sternal body, clinical correlation with palpitation recommended. 7. Osteopenia. No acute fracture identified. CT of the abdomen and pelvis: 1. The bladder is significantly distended with suspected circumferential wall thickening with an area of asymmetric thickening measuring up to 2.7 cm. Findings are nonspecific and could be related to chronic cystitis or infection/inflammatory change, however, underlying mass or malignancy of the bladder is not excluded based on this study. 2. Multiple calcifications in the/within the pancreatic tail with increased focal density measured at approximately 1.8 x 2.3 cm possibly reflect 4 partially calcified pancreatic mass. Following resolution of patient's acute symptomatology, further evaluation with MRI the pancreas recommended. 3. Multiple bilateral renal cysts, a large right renal cyst anterior right renal cortex with thinning of the renal cortices bilaterally. 4. Age-indeterminate superior endplate deformity of V51 with approximately 10-15% loss of vertebral body height consistent with age-indeterminate compression fracture/anterior wedging. Osteopenia with mild to moderate degenerative disc disease L2-S1. 5. Periportal edema within the liver, nonspecific finding. CT of the lumbar spine: 1. Age-indeterminate anterior wedging of the T12 vertebral body (with approximately 10-15% loss of vertebral body height), and of the L1 vertebral body (with approximately 5-10% loss of vertebral body height). Findings are indeterminate without the benefit of comparison studies. If there is clinical concern for acute compression fracture component, MRI of the lumbar spine is recommended. 2. Moderate degenerative disc disease L2-S1 with moderate bilateral neural foraminal narrowing L4-S1.  CT of the thoracic spine: 1. Age-indeterminate anterior wedging/compression deformity T6 vertebral body (with approximately 10% loss of vertebral body height), age indeterminate anterior wedging/compression deformity of the T12 vertebral body (with approximately 10-15% loss of vertebral body height), findings are indeterminate without comparison studies. If there is clinical concern for acute compression fracture, MRI of the thoracic spine is recommended. Ct Lumbar Spine Wo Contrast    Result Date: 2019  Patient MRN: [id number] : 1923 Age:  80 years Order Date: 2019 3:30 AM. Gender: Male Exam: CT ABDOMEN PELVIS W IV CONTRAST, CTA CHEST W WO CONTRAST, CT LUMBAR SPINE WO CONTRAST, CT THORACIC SPINE WO CONTRAST Number of Images: [id number] Indication:   Fall from standing, to the floor,. Contrast dosage: no contrast for the lumbar spine exam. Isovue-370, 100 mL, IV for the chest, abdomen and pelvis exam. Comparison: None. Findings: This examination was performed on a CT scanner with dose reduction. Technique: Low-dose CT  acquisition technique included one of following options; 1 . Automated exposure control, 2. Adjustment of MA and or KV according to patient's size or 3. Use of iterative reconstruction. FINDINGS: CTA of the chest with IV contrast examination: Thoracic aorta is not evaluated secondary to the specificity of the CTA chest exam. Possible thoracic aortic dissection is not excluded. There is no evidence of pulmonary embolism to the segmental level. More distal pulmonary emboli are not excluded. Prominence of the right and left main pulmonary arteries measuring 2.7 cm on the right and 2.8 cm on the left possibly suggestive of a degree of underlying pulmonary hypertension. Descending thoracic aortic aneurysm measuring 3.4 x 3.6 cm. Moderate cardiomegaly. Osteopenia. Vascular calcifications left anterior descending coronary artery. Thyroid unremarkable. No supraclavicular, axillary, mediastinal or hilar lymphadenopathy.  Visualized portions of the right and left clavicle, right and left scapula, and the visualized bilateral humerus, demonstrate no evidence the bladder, measuring up to approximately 1 cm in a relatively circumferential fashion. Focal area in the posterior right region of the bladder wall measures up to 2.7 cm. . Fecal retention within the rectum. Fecal retention scattered throughout colon. Moderate descending colon diverticulosis. No evidence of bowel obstruction. No abnormally enlarged small or large bowel. No free intraperitoneal air. No abnormally enlarged lymphadenopathy by CT size criteria. Osteopenia. Mild to moderate degenerative disc disease L2-S1 with moderate facet osteoarthropathy L3-S1. Age-indeterminate superior endplate deformity at P58 with approximately 10-15% loss of vertebral body height. No comparison studies are available. Normal sagittal alignment visualized lumbar spine. No pars interarticularis fracture defect. No perihepatic or perisplenic fluid is seen. Multiple small bilateral renal cysts. Delayed imaging sequences demonstrate there is partial opacification of the renal, ureteral or bladder areas. No large filling defect or mass is seen. No contrast extravasation outside normal renal, ureteral or bladder confines. There are portions of the  renal, ureteral or bladder systems are not opacified and are therefore not evaluated. CT of the lumbar spine without contrast examination: Visualized portions of the right and left ribs, pedicles, lamina, transverse processes, spinous processes, visualized sacrum, and visualized iliac wings demonstrate no acute fracture or lytic or blastic bony lesion. Osteopenia. Moderate degenerative disc  disease L2-S1. Moderate bilateral neural foraminal narrowing L4-S1. No malu spinal canal stenosis identified. No pars interarticularis fracture defect. Age-indeterminate superior plate deformity/anterior wedging of the L1 vertebral body with approximately 5-10% loss of vertebral body height. Similar findings of the T12 vertebral body with approximately 10-15% loss of anterior vertebral body height.  CT resolution of patient's acute symptomatology, further evaluation with MRI the pancreas recommended. 3. Multiple bilateral renal cysts, a large right renal cyst anterior right renal cortex with thinning of the renal cortices bilaterally. 4. Age-indeterminate superior endplate deformity of J19 with approximately 10-15% loss of vertebral body height consistent with age-indeterminate compression fracture/anterior wedging. Osteopenia with mild to moderate degenerative disc disease L2-S1. 5. Periportal edema within the liver, nonspecific finding. CT of the lumbar spine: 1. Age-indeterminate anterior wedging of the T12 vertebral body (with approximately 10-15% loss of vertebral body height), and of the L1 vertebral body (with approximately 5-10% loss of vertebral body height). Findings are indeterminate without the benefit of comparison studies. If there is clinical concern for acute compression fracture component, MRI of the lumbar spine is recommended. 2. Moderate degenerative disc disease L2-S1 with moderate bilateral neural foraminal narrowing L4-S1. CT of the thoracic spine: 1. Age-indeterminate anterior wedging/compression deformity T6 vertebral body (with approximately 10% loss of vertebral body height), age indeterminate anterior wedging/compression deformity of the T12 vertebral body (with approximately 10-15% loss of vertebral body height), findings are indeterminate without comparison studies. If there is clinical concern for acute compression fracture, MRI of the thoracic spine is recommended. Ct Abdomen Pelvis W Iv Contrast Additional Contrast? None    Result Date: 2019  Patient MRN: [id number] : 1923 Age:  80 years Order Date: 2019 3:30 AM. Gender: Male Exam: CT ABDOMEN PELVIS W IV CONTRAST, CTA CHEST W WO CONTRAST, CT LUMBAR SPINE WO CONTRAST, CT THORACIC SPINE WO CONTRAST Number of Images: 2150 Indication:   Fall from standing, to the floor,.  Contrast dosage: no contrast for the lumbar spine exam. Isovue-370, 100 mL, IV for the chest, abdomen and pelvis exam. Comparison: None. Findings: This examination was performed on a CT scanner with dose reduction. Technique: Low-dose CT  acquisition technique included one of following options; 1 . Automated exposure control, 2. Adjustment of MA and or KV according to patient's size or 3. Use of iterative reconstruction. FINDINGS: CTA of the chest with IV contrast examination: Thoracic aorta is not evaluated secondary to the specificity of the CTA chest exam. Possible thoracic aortic dissection is not excluded. There is no evidence of pulmonary embolism to the segmental level. More distal pulmonary emboli are not excluded. Prominence of the right and left main pulmonary arteries measuring 2.7 cm on the right and 2.8 cm on the left possibly suggestive of a degree of underlying pulmonary hypertension. Descending thoracic aortic aneurysm measuring 3.4 x 3.6 cm. Moderate cardiomegaly. Osteopenia. Vascular calcifications left anterior descending coronary artery. Thyroid unremarkable. No supraclavicular, axillary, mediastinal or hilar lymphadenopathy. Visualized portions of the right and left clavicle, right and left scapula, and the visualized bilateral humerus, demonstrate no evidence of fracture. Suspected remote healed fracture of the sternal body. No new acute fracture definitively identified, correlation with physical exam recommended. Severe degenerative disc disease lower cervical spine. Visualized right and left ribs demonstrate no acute fracture or lytic or blastic bony lesion. Right lun. No infiltrate/pneumonia, pneumothorax or pleural effusion. 2. No discrete noncalcified pulmonary nodule or spiculated mass. Left lun. Patchy airspace disease left lower lobe. 2. No discrete noncalcified pulmonary nodule or spiculated mass identified. CT of the abdomen and pelvis with IV contrast examination: Moderate cardiomegaly.  Periportal edema within the liver. Gallbladder unremarkable without cholelithiasis, choledocholithiasis, gallbladder sludge or pericholecystic fluid. No gallbladder distention. Thinning of the bilateral renal cortices. Areas of cortical scarring. No hydronephrosis or renal calculi. No solid mass. Right renal cyst. Esophagus, stomach, duodenum, spleen are unremarkable. Adrenal glands unremarkable. The pancreas demonstrates fatty  infiltration. There are scattered calcifications noted overlying the pancreatic tail in a focal region, which measures approximately 1.8 x 2.3 cm, possibly reflective of a partially calcified mass. Osteopenia. Visualized right humerus, radius and ulna as well as the portions of the fingers of bilateral upper extremities, visualized right ribs, visualized left ribs, visualized scapula, visualized pedicles, lamina, transverse processes and spinous processes demonstrate no acute fracture or lytic or blastic bony lesion. Visualized sacrum, bilateral iliac wings, bilateral acetabulum, bilateral superior and inferior pubic rami, and the bilateral proximal femurs demonstrate no acute fracture or lytic or blastic bony lesion. No intramuscular mass or hematoma. Prostate grossly unremarkable. Bladder is significantly distended. There is wall thickening of the bladder, measuring up to approximately 1 cm in a relatively circumferential fashion. Focal area in the posterior right region of the bladder wall measures up to 2.7 cm. . Fecal retention within the rectum. Fecal retention scattered throughout colon. Moderate descending colon diverticulosis. No evidence of bowel obstruction. No abnormally enlarged small or large bowel. No free intraperitoneal air. No abnormally enlarged lymphadenopathy by CT size criteria. Osteopenia. Mild to moderate degenerative disc disease L2-S1 with moderate facet osteoarthropathy L3-S1. Age-indeterminate superior endplate deformity at A73 with approximately 10-15% loss of vertebral body height.  No spinal canal stenosis are identified. CTA of the chest: 1. No CT evidence of pulmonary embolism to the segmental level. More distal pulmonary emboli are not excluded. 2. The thoracic aorta is not evaluated secondary to the specificity of the CTA chest exam. Potential dissection not excluded. 3. Descending thoracic aortic aneurysm measuring 3.4 x 3.6 cm. 4. Moderate cardiomegaly with vascular calcifications left anterior descending coronary artery. 5. Patchy airspace disease left lower lobe suggestive for possible developing infiltrate/pneumonia. 6. Suspected remote fracture of the sternal body, clinical correlation with palpitation recommended. 7. Osteopenia. No acute fracture identified. CT of the abdomen and pelvis: 1. The bladder is significantly distended with suspected circumferential wall thickening with an area of asymmetric thickening measuring up to 2.7 cm. Findings are nonspecific and could be related to chronic cystitis or infection/inflammatory change, however, underlying mass or malignancy of the bladder is not excluded based on this study. 2. Multiple calcifications in the/within the pancreatic tail with increased focal density measured at approximately 1.8 x 2.3 cm possibly reflect 4 partially calcified pancreatic mass. Following resolution of patient's acute symptomatology, further evaluation with MRI the pancreas recommended. 3. Multiple bilateral renal cysts, a large right renal cyst anterior right renal cortex with thinning of the renal cortices bilaterally. 4. Age-indeterminate superior endplate deformity of R70 with approximately 10-15% loss of vertebral body height consistent with age-indeterminate compression fracture/anterior wedging. Osteopenia with mild to moderate degenerative disc disease L2-S1. 5. Periportal edema within the liver, nonspecific finding.  CT of the lumbar spine: 1. Age-indeterminate anterior wedging of the T12 vertebral body (with approximately 10-15% loss of vertebral body height), and of the L1 vertebral body (with approximately 5-10% loss of vertebral body height). Findings are indeterminate without the benefit of comparison studies. If there is clinical concern for acute compression fracture component, MRI of the lumbar spine is recommended. 2. Moderate degenerative disc disease L2-S1 with moderate bilateral neural foraminal narrowing L4-S1. CT of the thoracic spine: 1. Age-indeterminate anterior wedging/compression deformity T6 vertebral body (with approximately 10% loss of vertebral body height), age indeterminate anterior wedging/compression deformity of the T12 vertebral body (with approximately 10-15% loss of vertebral body height), findings are indeterminate without comparison studies. If there is clinical concern for acute compression fracture, MRI of the thoracic spine is recommended. Xr Chest Portable    Result Date: 2019  Patient MRN: [id number] : 1923 Age:  80 years Gender: Male Order Date: 2019 3:30 AM. Exam: XR CHEST PORTABLE Number of Views: 1 Indication:  Fall from standing Comparison: 2019 Findings: Vascular calcifications thoracic aorta. Cardiomediastinal silhouette is enlarged compared with previous exam with mild central pulmonary vascular congestion. No pneumothorax. Questionable fracture of the distal left clavicle. 1. There is a questionable fracture of the distal left clavicle, not well evaluated, further evaluation dedicated shoulder radiographs recommended. 2. Cardiac mediastinal silhouette is enlarged when compared with the previous exam, an indeterminate finding. Underlying mild central pulmonary vascular congestion with thoracic aortic vascular calcifications. EKG: Rhythm was noted. Has short bursts of V. tach. And then gets tachycardia episode. Most likely has significant AV chato disease. ?SSS      Assessment:    Active Hospital Problems    Diagnosis Date Noted    Acute renal failure (ARF) ([facility] [address] [N17.9] 12/28/2019         Tachy/Jefferson syndrome       Plan:  1. Echocardiogram to evaluate LV function and rule out significant valvular disease            Electronically signed by Jorge A Chinchilla MD on 12/28/2019 at 2:25 PM

## 2019-12-28 NOTE — FLOWSHEET NOTE
[id number] pt to ICU from ER via cot. Slid to bed- pt yelling out \"leave me alone you sons of bitches! \" attempting to grab at staff with any hands on care. Alert to self only and thinks he is at home. MCKEON and will follow commands at times. MP SR to ST with PVCs, and will also dip in to SB as low as 40s. BP stable. LS CTA, on RA, no SOB. abd s/nt bs + x4. Pt unable to state when his last BM was. Esposito CD [place] with clots noted. Skin dry/flaky/poor turgor but intact. Preventative mepliex placed to coccyx. Pt kicking pillow out from under legs. Orders reviewed and initiated. Pt sons Rayray Moctezuma and Megan Palacios at bedside, updated. Privacy code given to them. 1200 pt intermittently attempting to get OOB, difficult to redirect- attempting to pull at IVs and catheter. Attempts to grab and weakly kick towards staff who are trying to redirect him. Pt very [place] d/t L ear removed. Pt eventually calmed down, called for avasys to monitor for pulling at lines/tubes    1400 no urine output x2 hours. Bladder non-distended, clots noted in esposito tube. Irrigated with 10 cc NS. Pt drank a chocolate boost per request. Does not want to be touched or repositioned. Attempted turn assist on the bed and pt would yell out to be left alone and to stop moving him. 1405 message to Dr Svitlana Donahue re: meds, code status, and urine output. Orders received. Pt a [address] Mccormick Links here, updated. Pt calling out for his cap, thinks he is at home, attempted to redirect. Pt given surgical caps as a hat, pt settled down and went back to sleep. 1530 asleep, vss, appears comfortable. avasys remains in room. 1630 still little urine output 60 cc over 2 hours. Message to Dr. Svitlana Donahue re: u.o. no change from previous assessment- pt continues to be SR, will jump up to ST 100s at times, PVCs, and will drop to 40s but BP stable and pt asymptomatic at rest.     [id number] pt son here, updated. Pt woke up, attempting to get OOB.  Difficult to redirect, calling staff \"worthless piece of shits\" and \"sons of bitches\". Pt attempting to pull at catheter- urine appears more bloody compared to earlier, pajama pants put on pt to deter pulling. Assisted with repositioning and eating dinner currently.  _Electronically signed by Nichole Sweeney RN on 12/28/2019 at 6:39 PM

## 2019-12-28 NOTE — ED NOTES
Pt becoming extremely agitated. Unable to obtain EKG d/t pt pulling leads off as soon as they are placed. BARRIE Garcia notified. Son brought to bedside to assist with comforting pt.        Ha Coleman RN  12/28/19 0069

## 2019-12-29 ENCOUNTER — APPOINTMENT (OUTPATIENT)
Dept: ULTRASOUND IMAGING | Age: 84
DRG: 683 | End: 2019-12-29
Payer: MEDICARE

## 2019-12-29 LAB
ACANTHOCYTES: ABNORMAL
ALBUMIN SERPL-MCNC: 2.8 G/DL (ref 3.5–4.6)
ANION GAP SERPL CALCULATED.3IONS-SCNC: 11 MEQ/L (ref 9–15)
ANISOCYTOSIS: ABNORMAL
BASOPHILS ABSOLUTE: 0.1 K/UL (ref 0–0.2)
BASOPHILS RELATIVE PERCENT: 0.9 %
BUN BLDV-MCNC: 28 MG/DL (ref 8–23)
BURR CELLS: ABNORMAL
CALCIUM SERPL-MCNC: 7.9 MG/DL (ref 8.5–9.9)
CHLORIDE BLD-SCNC: 113 MEQ/L (ref 95–107)
CO2: 16 MEQ/L (ref 20–31)
CREAT SERPL-MCNC: 0.99 MG/DL (ref 0.7–1.2)
EOSINOPHILS ABSOLUTE: 0.1 K/UL (ref 0–0.7)
EOSINOPHILS RELATIVE PERCENT: 0.7 %
GFR AFRICAN AMERICAN: >60
GFR NON-AFRICAN AMERICAN: >60
GLUCOSE BLD-MCNC: 113 MG/DL (ref 70–99)
HCT VFR BLD CALC: 26.5 % (ref 42–52)
HEMOGLOBIN: 8.8 G/DL (ref 14–18)
LYMPHOCYTES ABSOLUTE: 0.9 K/UL (ref 1–4.8)
LYMPHOCYTES RELATIVE PERCENT: 9.1 %
MACROCYTES: ABNORMAL
MCH RBC QN AUTO: 35.6 PG (ref 27–31.3)
MCHC RBC AUTO-ENTMCNC: 33.1 % (ref 33–37)
MCV RBC AUTO: 107.3 FL (ref 80–100)
MONOCYTES ABSOLUTE: 0.6 K/UL (ref 0.2–0.8)
MONOCYTES RELATIVE PERCENT: 5.7 %
NEUTROPHILS ABSOLUTE: 8.7 K/UL (ref 1.4–6.5)
NEUTROPHILS RELATIVE PERCENT: 83.6 %
OVALOCYTES: ABNORMAL
PDW BLD-RTO: 15.3 % (ref 11.5–14.5)
PHOSPHORUS: 2.6 MG/DL (ref 2.3–4.8)
PLATELET # BLD: 159 K/UL (ref 130–400)
PLATELET SLIDE REVIEW: NORMAL
POIKILOCYTES: ABNORMAL
POTASSIUM SERPL-SCNC: 5 MEQ/L (ref 3.4–4.9)
RBC # BLD: 2.47 M/UL (ref 4.7–6.1)
SODIUM BLD-SCNC: 140 MEQ/L (ref 135–144)
TEAR DROP CELLS: ABNORMAL
URINE CULTURE, ROUTINE: NORMAL
WBC # BLD: 10.4 K/UL (ref 4.8–10.8)

## 2019-12-29 PROCEDURE — 85025 COMPLETE CBC W/AUTO DIFF WBC: CPT

## 2019-12-29 PROCEDURE — 1210000000 HC MED SURG R&B

## 2019-12-29 PROCEDURE — 80069 RENAL FUNCTION PANEL: CPT

## 2019-12-29 PROCEDURE — 2580000003 HC RX 258: Performed by: INTERNAL MEDICINE

## 2019-12-29 PROCEDURE — 6360000002 HC RX W HCPCS: Performed by: INTERNAL MEDICINE

## 2019-12-29 PROCEDURE — 51702 INSERT TEMP BLADDER CATH: CPT

## 2019-12-29 PROCEDURE — 92610 EVALUATE SWALLOWING FUNCTION: CPT

## 2019-12-29 PROCEDURE — 36415 COLL VENOUS BLD VENIPUNCTURE: CPT

## 2019-12-29 PROCEDURE — 76775 US EXAM ABDO BACK WALL LIM: CPT

## 2019-12-29 PROCEDURE — 2700000000 HC OXYGEN THERAPY PER DAY

## 2019-12-29 PROCEDURE — 6370000000 HC RX 637 (ALT 250 FOR IP): Performed by: INTERNAL MEDICINE

## 2019-12-29 RX ADMIN — SODIUM CHLORIDE: 9 INJECTION, SOLUTION INTRAVENOUS at 00:06

## 2019-12-29 RX ADMIN — SODIUM CHLORIDE: 9 INJECTION, SOLUTION INTRAVENOUS at 17:04

## 2019-12-29 RX ADMIN — LEVOTHYROXINE SODIUM 100 MCG: 100 TABLET ORAL at 06:26

## 2019-12-29 RX ADMIN — CEFTRIAXONE SODIUM 1 G: 1 INJECTION, POWDER, FOR SOLUTION INTRAMUSCULAR; INTRAVENOUS at 08:46

## 2019-12-29 RX ADMIN — ENOXAPARIN SODIUM 30 MG: 30 INJECTION SUBCUTANEOUS at 17:03

## 2019-12-29 ASSESSMENT — PAIN SCALES - GENERAL
PAINLEVEL_OUTOF10: 0

## 2019-12-29 NOTE — PROGRESS NOTES
[facility] Occupational Therapy      Date: 2019  Patient Name: Rony Go        MRN: [id number]  Account: [de-identified]   : 1923  (80 y.o.)  Room: [place] [address]    Chart reviewed, attempted OT at  for eval. Patient unavailable 2° to:    [] Hold per nsg request    [x] Pt declined  - pt refused, confused and lethargic    [] Pt. . off floor for test/procedure. Will attempt again when able.     Electronically signed by Estephania Whitten OT on 2019 at 3:15 PM

## 2019-12-29 NOTE — PROGRESS NOTES
Physical Therapy   Facility/Department: [facility] MED SURG J174/[id number]    NAME: Carlene Wadsworth    : 1923 (80 y.o.)  MRN: [id number]    Account: [de-identified]  Gender: male    PT evaluation and treatment orders received. Chart reviewed. PT eval attempted.      Patient Unavailable: pt at [address] [facility]    Electronically signed by Kam Barker PT on 19 at 1:53 PM

## 2019-12-29 NOTE — PROGRESS NOTES
Directions: Simple(WITH MAX CUES)  Dentition: Some missing teeth  Patient Positioning: Upright in bed  Baseline Vocal Quality: Normal  Volitional Cough: Strong  Volitional Swallow: Delayed  Prior Dysphagia History: Per chart review, pt with hx of dysphagia. Consistencies Administered: Dysphagia Soft and Bite-Sized (Dysphagia III); Dysphagia Pureed (Dysphagia I); Thin - cup; Thin - straw;Nectar - straw    Current Diet level:  Current Diet : Regular  Current Liquid Diet : Thin    Oral Motor Deficits  Oral/Motor  Oral Motor: Exceptions to [facility]  Labial ROM: Reduced left  Labial Symmetry: Abnormal symmetry left  Labial Strength: Reduced  Labial Sensation: Reduced  Labial Coordination: Reduced  Lingual ROM: Reduced left  Lingual Symmetry: Abnormal symmetry right  Lingual Strength: Reduced  Lingual Sensation: Reduced  Lingual Coordination: Reduced  Velum: Impaired coordination    Oral Phase Dysfunction  Oral Phase  Oral Phase: Exceptions  Oral Phase Dysfunction  Impaired Mastication: Mechanical soft  Decreased Anterior to Posterior Transit: Mechanical soft;Puree  Suspected Premature Bolus Loss: Thin - straw  Oral Phase  Oral Phase - Comment: Moderate-severe oral dysphagia with noted difficulties with A-P transit, clearance of oral residuals, mastication resulting in anterior spillage on left. Indicators of Pharyngeal Phase Dysfunction   Pharyngeal Phase  Pharyngeal Phase: Exceptions  Indicators of Pharyngeal Phase Dysfunction  Decreased Laryngeal Elevation: All  Wet Vocal Quality: Thin - straw  Cough - Immediate: Thin  Cough - Delayed: Puree - teaspoon  Pharyngeal Phase   Pharyngeal: Moderate to severe pharyngeal phase dysphagia as evidenced by decreased hyolaryngeal elevation as measured by manual palpation, decreased airway protection noted by immediate cough post swallow of thin liquids via straw sips, and wet vocal quality. Impression  Dysphagia Diagnosis: Moderate to severe pharyngeal stage dysphagia; Moderate to severe oral stage dysphagia  Dysphagia Impression : Moderate-severe oral dysphagia with noted difficulties with A-P transit, clearance of oral residuals, mastication resulting in anterior spillage on left. Moderate to severe pharyngeal phase dysphagia as evidenced by decreased hyolaryngeal elevation as measured by manual palpation, decreased airway protection noted by immediate cough post swallow of thin liquids via straw sips, and wet vocal quality. Dysphagia Outcome Severity Scale: Level 2: Moderate Severe dysphagia- Maximum assistance or maximum use of strategies with partial PO only     Treatment Plan  Requires SLP Intervention: Yes  Duration/Frequency of Treatment: 2-3x/week           Treatment/Goals  Short-term Goals  Timeframe for Short-term Goals: 1 week   Goal 1: Pt will swallow puree consistencies with utilization of safe swallowing guidelines as cued by caregivers with 80% of opportunities. Goal 2: Pt will swallow nectar thickened liquids via controlled straw sip by caregivers/staff with <20% overt s/s of aspiration. Long-term Goals  Timeframe for Long-term Goals: 1 WEEK  Goal 1: Pt will tolerate recommended diet consistency with <20% overt s/s of aspiration noted. Prognosis  Prognosis  Prognosis for safe diet advancement: poor  Barriers to reach goals: age;severity of dysphagia  Individuals consulted  Consulted and agree with results and recommendations: RN(DANIELA Mijares)    Education  Patient Education: Pt educated regarding results of bedside swallow study. Patient Education Response: Needs reinforcement; No evidence of learning  Safety Devices in place: Yes  Type of devices:  All fall risk precautions in place    Pain:  Pain Assessment  Patient Currently in Pain: No  Pain Assessment: Faces  Pain Level: 0       Therapy Time  SLP Individual Minutes  Time In: 0945  Time Out: [facility]  Minutes: 20              Signature: Electronically signed by Nolan Bradshaw MA CCC-SLP on 12/29/2019 at 10:20

## 2019-12-29 NOTE — PROGRESS NOTES
This patient has both a [address] document and a Living Will Declaration. A copy of both documents can be located in the [employer] Tab. This patient named his son, Enzo Calle. [facility] ([phone]) as his primary agent.     Charleen Rocha BCC

## 2019-12-29 NOTE — PROGRESS NOTES
Uneventful shift. Monitor has shown sinus rhythm without ectopy. Vital signs normal.  Blanco drains a bloody urine. Patient remains confused but is fairly cooperative.

## 2019-12-29 NOTE — FLOWSHEET NOTE
0715 update received. Pt asleep RR even/unlab, vss. Will monitor    0830 assessment complete, see flowsheet. Pt alert to self, [place] (hearing aid in place but battery may be dead), MCKEON. Will follow commands once he hears them. MP SB 50s. PPP trace edema to blle. LS c/dim, on 3L NC, removed, sats stable. IVF infusing. Esposito CD bloody urine with clots, draining well. Will monitor. 0900 Dr. Bhavya High here, updated. See orders. [facility] here to see pt, recommend puree/nectar thick liquids. Message to Dr. Bhavya High for diet order change. 1130 pt continues to rest, vss, esposito catheter removed per order, bloody urine with clots    1200 report to [facility] RN on 2W. 1220 call to Vipul Esparzaco, pt son, updated on transfer to 2W/room assignment. _Electronically signed by Shakila White RN on 12/29/2019 at 12:25 PM    1300 pt to 2W, call received for pt to go to ultrasound. RN Alyson notified that pt going to ultrasound first then 2W. Call to dietary for correct tray puree/nectar thick.  _Electronically signed by Shakila White RN on 12/29/2019 at 1:05 PM

## 2019-12-29 NOTE — ACP (ADVANCE CARE PLANNING)
This patient has both a [address] document and a Living Will Declaration. A copy of both documents can be located in the [employer] Tab. This patient named his son, Loc Valencia. Quinn Mon ([phone]) as his primary agent.     Stella Higgins BCC

## 2019-12-29 NOTE — PROGRESS NOTES
in the last 72 hours. Objective:   Vitals: /81   Pulse 57   Temp 97.8 °F (36.6 °C) (Oral)   Resp 19   Ht 5' 9\" (1.753 m)   Wt 152 lb 8.9 oz (69.2 kg)   SpO2 100%   BMI 22.53 kg/m²    Wt Readings from Last 3 Encounters:   12/29/19 152 lb 8.9 oz (69.2 kg)      24HR INTAKE/OUTPUT:      Intake/Output Summary (Last 24 hours) at 12/29/2019 0901  Last data filed at 12/29/2019 0555  Gross per 24 hour   Intake 5839 ml   Output 2050 ml   Net 3789 ml       Constitutional:  sleepy   Skin:normal, no rash  HEENT:sclera anicteric.   Head atraumatic normocephalic  Neck:supple with no thyromegally  Cardiovascular:  S1, S2 without m/r/g   Respiratory:  CTA B without w/r/r   Abdomen: +bs, soft, nt  Ext: no LE edema  Musculoskeletal:Intact  Neuro:Alert and oriented with no deficit      Electronically signed by Dmitry Barriga MD on 12/29/2019 at 9:01 AM

## 2019-12-30 LAB
ALBUMIN SERPL-MCNC: 2.6 G/DL (ref 3.5–4.6)
ALP BLD-CCNC: 77 U/L (ref 35–104)
ALT SERPL-CCNC: 47 U/L (ref 0–41)
ANION GAP SERPL CALCULATED.3IONS-SCNC: 10 MEQ/L (ref 9–15)
AST SERPL-CCNC: 107 U/L (ref 0–40)
BASOPHILS ABSOLUTE: 0 K/UL (ref 0–0.2)
BASOPHILS RELATIVE PERCENT: 0.5 %
BILIRUB SERPL-MCNC: 0.3 MG/DL (ref 0.2–0.7)
BUN BLDV-MCNC: 19 MG/DL (ref 8–23)
CALCIUM SERPL-MCNC: 7.9 MG/DL (ref 8.5–9.9)
CHLORIDE BLD-SCNC: 109 MEQ/L (ref 95–107)
CO2: 20 MEQ/L (ref 20–31)
CREAT SERPL-MCNC: 0.81 MG/DL (ref 0.7–1.2)
EOSINOPHILS ABSOLUTE: 0.1 K/UL (ref 0–0.7)
EOSINOPHILS RELATIVE PERCENT: 1.1 %
GFR AFRICAN AMERICAN: >60
GFR NON-AFRICAN AMERICAN: >60
GLOBULIN: 3.2 G/DL (ref 2.3–3.5)
GLUCOSE BLD-MCNC: 90 MG/DL (ref 70–99)
HCT VFR BLD CALC: 25.4 % (ref 42–52)
HEMOGLOBIN: 8.7 G/DL (ref 14–18)
LV EF: 60 %
LVEF MODALITY: NORMAL
LYMPHOCYTES ABSOLUTE: 0.9 K/UL (ref 1–4.8)
LYMPHOCYTES RELATIVE PERCENT: 10.7 %
MCH RBC QN AUTO: 35.1 PG (ref 27–31.3)
MCHC RBC AUTO-ENTMCNC: 34.2 % (ref 33–37)
MCV RBC AUTO: 102.6 FL (ref 80–100)
MONOCYTES ABSOLUTE: 0.5 K/UL (ref 0.2–0.8)
MONOCYTES RELATIVE PERCENT: 6.6 %
NEUTROPHILS ABSOLUTE: 6.5 K/UL (ref 1.4–6.5)
NEUTROPHILS RELATIVE PERCENT: 81.1 %
PDW BLD-RTO: 14.1 % (ref 11.5–14.5)
PLATELET # BLD: 167 K/UL (ref 130–400)
POTASSIUM SERPL-SCNC: 4.4 MEQ/L (ref 3.4–4.9)
RBC # BLD: 2.47 M/UL (ref 4.7–6.1)
SODIUM BLD-SCNC: 139 MEQ/L (ref 135–144)
TOTAL PROTEIN: 5.8 G/DL (ref 6.3–8)
WBC # BLD: 8 K/UL (ref 4.8–10.8)

## 2019-12-30 PROCEDURE — 1210000000 HC MED SURG R&B

## 2019-12-30 PROCEDURE — 6360000002 HC RX W HCPCS: Performed by: INTERNAL MEDICINE

## 2019-12-30 PROCEDURE — 85025 COMPLETE CBC W/AUTO DIFF WBC: CPT

## 2019-12-30 PROCEDURE — 99221 1ST HOSP IP/OBS SF/LOW 40: CPT | Performed by: UROLOGY

## 2019-12-30 PROCEDURE — 6370000000 HC RX 637 (ALT 250 FOR IP): Performed by: INTERNAL MEDICINE

## 2019-12-30 PROCEDURE — 80053 COMPREHEN METABOLIC PANEL: CPT

## 2019-12-30 PROCEDURE — 6370000000 HC RX 637 (ALT 250 FOR IP): Performed by: UROLOGY

## 2019-12-30 PROCEDURE — 92526 ORAL FUNCTION THERAPY: CPT

## 2019-12-30 PROCEDURE — 36415 COLL VENOUS BLD VENIPUNCTURE: CPT

## 2019-12-30 PROCEDURE — 93306 TTE W/DOPPLER COMPLETE: CPT

## 2019-12-30 PROCEDURE — 2580000003 HC RX 258: Performed by: INTERNAL MEDICINE

## 2019-12-30 RX ORDER — FINASTERIDE 5 MG/1
5 TABLET, FILM COATED ORAL DAILY
Status: DISCONTINUED | OUTPATIENT
Start: 2019-12-30 | End: 2019-12-31 | Stop reason: HOSPADM

## 2019-12-30 RX ORDER — ALISKIREN 150 MG/1
TABLET, FILM COATED ORAL
Status: ON HOLD | COMMUNITY
End: 2019-12-31 | Stop reason: HOSPADM

## 2019-12-30 RX ORDER — TAMSULOSIN HYDROCHLORIDE 0.4 MG/1
0.4 CAPSULE ORAL DAILY
Status: DISCONTINUED | OUTPATIENT
Start: 2019-12-30 | End: 2019-12-31 | Stop reason: HOSPADM

## 2019-12-30 RX ADMIN — FINASTERIDE 5 MG: 5 TABLET, FILM COATED ORAL at 12:34

## 2019-12-30 RX ADMIN — ENOXAPARIN SODIUM 30 MG: 30 INJECTION SUBCUTANEOUS at 09:06

## 2019-12-30 RX ADMIN — LEVOTHYROXINE SODIUM 100 MCG: 100 TABLET ORAL at 05:16

## 2019-12-30 RX ADMIN — CEFTRIAXONE SODIUM 1 G: 1 INJECTION, POWDER, FOR SOLUTION INTRAMUSCULAR; INTRAVENOUS at 09:05

## 2019-12-30 RX ADMIN — Medication 10 ML: at 22:42

## 2019-12-30 RX ADMIN — TAMSULOSIN HYDROCHLORIDE 0.4 MG: 0.4 CAPSULE ORAL at 12:34

## 2019-12-30 ASSESSMENT — PAIN SCALES - GENERAL
PAINLEVEL_OUTOF10: 0

## 2019-12-30 NOTE — PROGRESS NOTES
[facility] Occupational Therapy      Date: 2019  Patient Name: Reynaldo Rebolledo        MRN: [id number]  Account: [de-identified]   : 1923  (80 y.o.)  Room: [place] [address]    Chart reviewed, attempted OT at [address] [place], [place] [address] for OT Evaluation. Pt initially agreeable to evaluation. Social functional history gathered. However, when asked to perform ROM and ADLs, pt declined participation. Educated pt on importance of evaluation and pt continued to decline. Unable to complete evaluation d/t pt refusal.     Spoke to DEREK RN aware. Will attempt again when able.     Electronically signed by Trinidad Wooten OT on 2019 at 11:57 AM

## 2019-12-30 NOTE — PLAN OF CARE
Nutrition Problem: Predicted suboptimal energy intake  Intervention: Food and/or Nutrient Delivery: Continue current diet, Start ONS(Continue with Pureed diet and mildly thick (nectar) liquids. Add Frozne ONS @ L & D.  Goal > 1500 ml mildly thick liquids per day for hydration)  Nutritional Goals: po > 75% meals < 1500 ml mildly thick liquids, wt > 150#

## 2019-12-30 NOTE — PROGRESS NOTES
12/30/2019    EOSABS 0.1 12/30/2019    BASOSABS 0.0 12/30/2019     CMP:    Lab Results   Component Value Date     12/30/2019    K 4.4 12/30/2019     12/30/2019    CO2 20 12/30/2019    BUN 19 12/30/2019    CREATININE 0.81 12/30/2019    GFRAA >60.0 12/30/2019    LABGLOM >60.0 12/30/2019    GLUCOSE 90 12/30/2019    GLUCOSE 99 12/27/2011    PROT 5.8 12/30/2019    LABALBU 2.6 12/30/2019    LABALBU 4.3 12/27/2011    CALCIUM 7.9 12/30/2019    BILITOT 0.3 12/30/2019    ALKPHOS 77 12/30/2019     12/30/2019    ALT 47 12/30/2019     BMP:    Lab Results   Component Value Date     12/30/2019    K 4.4 12/30/2019     12/30/2019    CO2 20 12/30/2019    BUN 19 12/30/2019    LABALBU 2.6 12/30/2019    LABALBU 4.3 12/27/2011    CREATININE 0.81 12/30/2019    CALCIUM 7.9 12/30/2019    GFRAA >60.0 12/30/2019    LABGLOM >60.0 12/30/2019    GLUCOSE 90 12/30/2019    GLUCOSE 99 12/27/2011     Magnesium:    Lab Results   Component Value Date    MG 2.0 12/28/2019     Troponin:    Lab Results   Component Value Date    TROPONINI 0.047 12/28/2019       Radiology:  Us Retroperitoneal Limited    Result Date: 12/30/2019  EXAMINATION: US RETROPERITONEAL LIMITED DATE AND TIME:12/28/2019 10:00 AM CLINICAL HISTORY: HYDRONEPHROSIS. ACUTE RENAL FAILURE, UTI  COMPARISONS:  NONE FINDINGS: Right kidney 10.6 cm. Left kidney. 9.3 cm. 1 cm exophytic cyst on the right kidney. 1 cm hypoechoic area in the midpole of the left kidney. These correspond to cysts seen on the CT scan of the abdomen and pelvis from 12/28/2019. Echogenicity The echogenicity of renal cortex is less than the adjacent liver. Hydronephrosis There is no hydronephrosis. Mass: No solid renal mass. No renal calculi. Bladder incompletely distended with prominent trabeculae. Assessment of the bladder is limited on this exam.     NO HYDRONEPHROSIS. NO RENAL MASS. EKG:  Assessment:    Active Hospital Problems    Diagnosis Date Noted    Fall [W19.  [place]ave Urinary retention [R33.9]     Gross hematuria [R31.0]     Acute renal failure (ARF) ([facility] [address] [N17.9] 12/28/2019           Plan:  1. Check Echo. No OAC high risk for falls.   Electronically signed by Klaudia Becerra MD on 12/30/2019 at 4:35 PM

## 2019-12-30 NOTE — PROGRESS NOTES
Physical Therapy   Facility/Department: [facility] MED SURG V787/D660-45    NAME: Rimma Staton    : 1923 (80 y.o.)  MRN: [id number]    Account: [de-identified]  Gender: male    PT evaluation and treatment orders received. Chart reviewed. PT eval attempted. Patient adamantly refused. Pt A&O x4 and provides PLOF but then refuses all mobility. Educated pt in purpose of mobility assessment to ensure safe DC plan but pt reports \"No, my sons will just pick me up and take me home, I'm 89. I'm not doing this\". Could not convince pt to get up to chair or even sit at EOB. RN aware. Will attempt PT evaluation again at earliest convenience.       Electronically signed by Hazel Arriaza PT on 19 at 12:04 PM

## 2019-12-30 NOTE — CONSULTS
to [facility]IP[facility]. REVIEW OF SYSTEMS:  Not obtainable. He is DNR comfort care-arrest.    PHYSICAL EXAMINATION:  GENERAL:  He is a frail-appearing white male lying in bed in no obvious  acute distress. VITAL SIGNS:  His temperature is 37.5, heart rate 90, respiratory rate  20, and blood pressure 139/74. HEENT:  He has some deformities of the skin due to radiation in his  prior ear surgery. LUNGS:  Clear to auscultation bilaterally without rhonchi, wheezing or  rales. HEART:  Regular rate and rhythm without any murmurs. ABDOMEN:  Soft, nondistended, nontender. He had no palpable  organomegaly. He had bowel sounds positive. He had no CVA tenderness. GENITOURINARY:  Revealed a normal male phallus. Blanco catheter  emanating from the meatus draining pink tinged urine. His testes were  downgoing bilaterally without masses. Scrotum was without abnormality. He had no obvious inguinal hernias. EXTREMITIES:  Showed no cyanosis or edema. NEUROLOGICAL:  He was alert. PSYCHIATRIC:  He had a flat affect. LABORATORY DATA:  He has a white count of 8, hematocrit of 25, and  platelet count of 149. His creatinine is normal at 0.81. He had a  urine culture done which showed no growth on 12/28. He had an INR of  1.2. He had a renal ultrasound done showing no hydronephrosis or renal  mass. IMPRESSION:  A 51-year-old male with history of hypertension,  hypothyroidism, head and neck cancer, and left ear cancer who was  admitted after a fall and has developed urinary retention. He has some  hematuria related to the Blanco catheter and the use of Lovenox. He has  no urinary tract infection identified. At this point, I would hold all  anticoagulants including Lovenox. We would start him on daily dosing of  Flomax and Proscar and this was discussed with the patient's nurse and  Dr. Hung Mendez.   On discharge to a SNF, I would have him discharged with  Blanco catheter for about a week and then again I will have a

## 2019-12-30 NOTE — DISCHARGE INSTR - COC
Continuity of Care Form    Patient Name: Nitesh Carrillo   :  1923  MRN:  [id number]    [facility] date:  2019  Discharge date:  19    Code Status Order: DNR-CCA   Advance Directives:   [facility] Documentation     Date/Time Healthcare Directive Type of Healthcare Directive Copy in [address] Agent's Name Healthcare Agent's Phone Number    19 0140  Yes, patient has an advance directive for healthcare treatment  Durable power of  for health care;Living will  Yes, copy in chart Copy in [employer]  Adult Children  [place]. Amira Pi  [phone]    19 1520  Yes, patient has an advance directive for healthcare treatment  Durable power of  for health care  --  --  --  --          Admitting Physician:  Bk Hanks MD  PCP: Brit Garcia DO    Discharging Nurse[place] [facility] [place]Room[place] R146/T827-58  Discharging Unit Phone Number: [phone]    Emergency Contact:   Extended Emergency Contact Information  Primary Emergency Contact: [address] [facility] Phone: [phone]  Mobile Phone: [phone]  Relation: None  Secondary Emergency Contact: Shivani Guzman  [place] Phone: [phone]  Mobile Phone: [phone]  Relation: Child    Past Surgical History:  History reviewed. No pertinent surgical history. Immunization History: There is no immunization history on file for this patient.     Active Problems:  Patient Active Problem List   Diagnosis Code    Acute renal failure (ARF) ([address] N17.9       Isolation/Infection:   Isolation          No Isolation        Patient Infection Status     None to display          Nurse Assessment:  Last Vital Signs: /74   Pulse 90   Temp 99.5 °F (37.5 °C) (Oral)   Resp 20   Ht 5' 9\" (1.753 m)   Wt 152 lb 8.9 oz (69.2 kg)   SpO2 95%   BMI 22.53 kg/m²     Last documented pain score (0-10 scale): Pain Level: 0  Last Weight:   Wt Readings from Last 1 Encounters:

## 2019-12-31 VITALS
TEMPERATURE: 98.2 F | HEIGHT: 69 IN | HEART RATE: 64 BPM | OXYGEN SATURATION: 97 % | RESPIRATION RATE: 18 BRPM | WEIGHT: 152.56 LBS | DIASTOLIC BLOOD PRESSURE: 82 MMHG | SYSTOLIC BLOOD PRESSURE: 148 MMHG | BODY MASS INDEX: 22.6 KG/M2

## 2019-12-31 LAB
ANION GAP SERPL CALCULATED.3IONS-SCNC: 12 MEQ/L (ref 9–15)
BASOPHILS ABSOLUTE: 0 K/UL (ref 0–0.2)
BASOPHILS RELATIVE PERCENT: 0.8 %
BUN BLDV-MCNC: 19 MG/DL (ref 8–23)
CALCIUM SERPL-MCNC: 8.1 MG/DL (ref 8.5–9.9)
CHLORIDE BLD-SCNC: 105 MEQ/L (ref 95–107)
CO2: 20 MEQ/L (ref 20–31)
CREAT SERPL-MCNC: 0.92 MG/DL (ref 0.7–1.2)
EOSINOPHILS ABSOLUTE: 0.1 K/UL (ref 0–0.7)
EOSINOPHILS RELATIVE PERCENT: 2 %
GFR AFRICAN AMERICAN: >60
GFR NON-AFRICAN AMERICAN: >60
GLUCOSE BLD-MCNC: 99 MG/DL (ref 70–99)
HCT VFR BLD CALC: 23.2 % (ref 42–52)
HEMOGLOBIN: 8 G/DL (ref 14–18)
LYMPHOCYTES ABSOLUTE: 1 K/UL (ref 1–4.8)
LYMPHOCYTES RELATIVE PERCENT: 17.1 %
MCH RBC QN AUTO: 35.4 PG (ref 27–31.3)
MCHC RBC AUTO-ENTMCNC: 34.6 % (ref 33–37)
MCV RBC AUTO: 102.1 FL (ref 80–100)
MONOCYTES ABSOLUTE: 0.5 K/UL (ref 0.2–0.8)
MONOCYTES RELATIVE PERCENT: 9.8 %
NEUTROPHILS ABSOLUTE: 4 K/UL (ref 1.4–6.5)
NEUTROPHILS RELATIVE PERCENT: 70.3 %
PDW BLD-RTO: 14.5 % (ref 11.5–14.5)
PLATELET # BLD: 159 K/UL (ref 130–400)
POTASSIUM SERPL-SCNC: 4.3 MEQ/L (ref 3.4–4.9)
RBC # BLD: 2.27 M/UL (ref 4.7–6.1)
SODIUM BLD-SCNC: 137 MEQ/L (ref 135–144)
WBC # BLD: 5.6 K/UL (ref 4.8–10.8)

## 2019-12-31 PROCEDURE — 6370000000 HC RX 637 (ALT 250 FOR IP): Performed by: INTERNAL MEDICINE

## 2019-12-31 PROCEDURE — 2580000003 HC RX 258: Performed by: INTERNAL MEDICINE

## 2019-12-31 PROCEDURE — 97166 OT EVAL MOD COMPLEX 45 MIN: CPT

## 2019-12-31 PROCEDURE — 93010 ELECTROCARDIOGRAM REPORT: CPT | Performed by: INTERNAL MEDICINE

## 2019-12-31 PROCEDURE — 6370000000 HC RX 637 (ALT 250 FOR IP): Performed by: UROLOGY

## 2019-12-31 PROCEDURE — 97162 PT EVAL MOD COMPLEX 30 MIN: CPT

## 2019-12-31 PROCEDURE — 85025 COMPLETE CBC W/AUTO DIFF WBC: CPT

## 2019-12-31 PROCEDURE — 80048 BASIC METABOLIC PNL TOTAL CA: CPT

## 2019-12-31 PROCEDURE — 36415 COLL VENOUS BLD VENIPUNCTURE: CPT

## 2019-12-31 PROCEDURE — 6360000002 HC RX W HCPCS: Performed by: INTERNAL MEDICINE

## 2019-12-31 RX ORDER — TAMSULOSIN HYDROCHLORIDE 0.4 MG/1
0.4 CAPSULE ORAL DAILY
Qty: 30 CAPSULE | Refills: 3
Start: 2020-01-01

## 2019-12-31 RX ORDER — FINASTERIDE 5 MG/1
5 TABLET, FILM COATED ORAL DAILY
Qty: 30 TABLET | Refills: 3
Start: 2020-01-01

## 2019-12-31 RX ADMIN — LEVOTHYROXINE SODIUM 100 MCG: 100 TABLET ORAL at 06:30

## 2019-12-31 RX ADMIN — Medication 10 ML: at 08:53

## 2019-12-31 RX ADMIN — FINASTERIDE 5 MG: 5 TABLET, FILM COATED ORAL at 08:52

## 2019-12-31 RX ADMIN — TAMSULOSIN HYDROCHLORIDE 0.4 MG: 0.4 CAPSULE ORAL at 08:52

## 2019-12-31 RX ADMIN — CEFTRIAXONE SODIUM 1 G: 1 INJECTION, POWDER, FOR SOLUTION INTRAMUSCULAR; INTRAVENOUS at 08:52

## 2019-12-31 ASSESSMENT — PAIN SCALES - GENERAL
PAINLEVEL_OUTOF10: 0

## 2019-12-31 NOTE — PROGRESS NOTES
[facility] Occupational Therapy      Date: 2019  Patient Name: Gloria Valerio        MRN: [id number]  Account: [de-identified]   : 1923  (80 y.o.)  Room: [place] [address]    Chart reviewed, attempted OT at 10:00 for OT evaluation. Patient not seen 2° to:    [] Hold per nsg request    [x] Pt declined-unable to understand written or verbal directions for transfer or ROM     [] Pt. off floor for test/procedure. Spoke to RN ***, RN aware. Will attempt again when able.     Electronically signed by PAUL Hood/L on 2019 at 10:34 AM

## 2019-12-31 NOTE — PROGRESS NOTES
Endurance:  Activity Tolerance  Activity Tolerance: Patient limited by fatigue    D/C Recommendations:  OT D/C RECOMMENDATIONS  REQUIRES OT FOLLOW UP: Yes         OT Follow Up:  OT D/C RECOMMENDATIONS  REQUIRES OT FOLLOW UP: Yes       Assessment/Discharge Disposition:  Assessment: Pt demonstrates above deficits and would benefit from skilled occupational therapy services in order to promote independence. Performance deficits / Impairments: Decreased functional mobility , Decreased strength, Decreased endurance, Decreased ADL status, Decreased balance  Prognosis: Fair  Discharge Recommendations: Subacute/Skilled Nursing Facility  Decision Making: Medium Complexity  History: multi co-morb    Six Click Score   How much help for putting on and taking off regular lower body clothing?: A Lot  How much help for Bathing?: A Lot  How much help for Toileting?: A Lot  How much help for putting on and taking off regular upper body clothing?: A Lot  How much help for taking care of personal grooming?: A Little  How much help for eating meals?: None  AM-[facility] Inpatient Daily Activity Raw Score: 15  AM-PAC Inpatient ADL T-Scale Score : 34.69  ADL Inpatient CMS 0-100% Score: 56.46    Plan:  Plan  Times per week: 1-3 x per week   Plan weeks: until D/C  Current Treatment Recommendations: Strengthening, Endurance Training, Balance Training, Self-Care / ADL    Goals:   Patient will:    - Improve functional endurance to tolerate/complete 30 mins of ADL's  - Be SBA in UB ADLs   - Be SBA in LB ADLs  - Be SBA in ADL transfers without LOB  - Be SBA in toileting tasks  - Access appropriate D/C site with as few architectural barriers as possible. Patient Goal: Patient goals : I am going to [place] to learn how to walk again so I can go home.      Discussed and agreed upon: Yes Comments:     Therapy Time:   OT Individual Minutes  Time In: 3358  Time Out: [address]  Minutes: 19      Electronically signed by:    Amandeep Chavarria OTR/L  12/31/2019, 11:36 AM

## 2019-12-31 NOTE — PROGRESS NOTES
Physical Therapy Med Surg Initial Assessment  Facility/Department: [facility]  Room: H240/O574-28       NAME: Sharita Kilpatrick  : 1923 (80 y.o.)  MRN: [id number]  CODE STATUS: DNR-CCA    Date of Service: 2019    Patient Diagnosis(es): Acute renal failure (ARF) [facility]) [N17.9]   Chief Complaint   Patient presents with    Fall     Patient Active Problem List    Diagnosis Date Noted    Fall     Urinary retention     Gross hematuria     Acute renal failure (ARF) ([facility] [address] 2019        Past Medical History:   Diagnosis Date    Cancer ([facility] [address]     skin CA left ear    Hypertension      History reviewed. No pertinent surgical history. Chart Reviewed: Yes  Patient assessed for rehabilitation services?: Yes  Family / Caregiver Present: No    Restrictions:  Restrictions/Precautions: Fall Risk  Position Activity Restriction  Other position/activity restrictions: [employer]     SUBJECTIVE: Subjective: Hand-written instructions and questions. PLOF obtained yesterday. 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 Pt reports he lives with his son and has a second son nearby who help him.  Pt sleeps in a recliner and only needs to go to bathroom and kitchen. OBJECTIVE:   Vision/Hearing:  Vision: Impaired  Vision Exceptions: Wears glasses at all times  Hearing: Exceptions to [facility]  Hearing Exceptions: Hard of hearing/hearing concerns;Right hearing aid(Severely hard of hearing)    Cognition:  Overall Orientation Status: Within Functional Limits  Follows Commands: Impaired(Ute barriers; initially reluctant but eventually agrees to attempt getting up)    Observation/Palpation  Posture: Fair(kyphotic, head fwd)    ROM:  RLE AROM: WFL  RLE General AROM: tightness into knee extension  LLE AROM : WFL    Strength:  Strength RLE  Comment: grossly 4-/5  Strength LLE  Comment: grossly 4/5    Neuro:  Balance  Sitting - Static: Fair;+  Sitting - Dynamic: Fair;+  Standing - Static: Fair;-  Standing - Dynamic: Poor;+(min A to maintain dynamic standing balance)             Bed mobility  Supine to Sit: Minimal assistance  Sit to Supine: Minimal assistance    Transfers  Sit to Stand: Minimal Assistance(+2 was present for safety as pt severely Ute and difficulty following commands; however, pt was able to stand about 2 min at EOB with CGA-min A provided hand-held support)  Bed to Chair: Unable to assess(declined stating high backed chairwould be too uncomfortable)    Ambulation  Ambulation?: No(pt began to self-initiate some marching in place; antalgic on RLE and required BUE support)    Activity Tolerance  Activity Tolerance: Patient Tolerated treatment well          ASSESSMENT:   Body structures, Functions, Activity limitations: Decreased functional mobility ; Decreased strength;Decreased endurance;Decreased balance  Decision Making: Medium Complexity  History: high  Exam: medium  Clinical Presentation: medium    Prognosis: Good  PT Education: Goals;PT Role;Plan of Care;Transfer Training  Barriers to Learning: Ute barrier    DISCHARGE RECOMMENDATIONS:  Discharge Recommendations: Continue to assess pending progress, Patient would benefit from continued therapy after discharge    Assessment: Pt with above deficits, yesterday stating he did not want therapy due to being 89 and not feeling like he has much room for improvement. Today, pt more agreeable and states he does want to be ambulatory again. Continue PT in order to progress mobility as tolerated.   REQUIRES PT FOLLOW UP: Yes      PLAN OF CARE:  Plan  Times per week: 3-6  Current Treatment Recommendations: Strengthening, Functional Mobility Training, Neuromuscular Re-education, Home Exercise Program, Equipment Evaluation, Education, & procurement, Transfer Training, Gait Training, Safety Education & Training, Balance Training, Endurance Training, Patient/Caregiver Education & Training  Safety Devices  Type of devices: Bed alarm in place, Call light within reach, Telesitter in use    Goals:  Patient goals : to go home  Long term goals  Long term goal 1: SBA with bed mobility  Long term goal 2: SBA with transfers  Long term goal 3: pt to ambulate >25 ft with SBA LRAD ([employer] vs hand-held assist)    [facility] (6 CLICK) [facility] Inpatient Mobility Raw Score : 14     Therapy Time:   Individual   Time In 1105   Time Out 1120   Minutes 15           Em Anton, [address] [place] [address], 12/31/19 at 12:26 PM

## 2019-12-31 NOTE — CARE COORDINATION
Pt will transfer to [facility] today via ambulance. Son aware and in agreement to transfer.
to the following treatment goals: pt is confused due to UTI. Son (POA) say [facility] for rehab may be needed. Initial Discharge Plan? (Note: please see concurrent daily documentation for any updates after initial note). From home with son. Independent at home prior to hospitalization. May need [facility] for rehab following the hospitalization.       The Patient and/or patient representative: *son* was provided with choice of any post-acute providers for care and equipment and agrees with discharge plan  Yes    Electronically signed by HORACIO Javier on 12/28/2019 at 12:13 PM

## 2019-12-31 NOTE — PROGRESS NOTES
[facility]   Facility/Department: [facility]  Speech Language Pathology    Sharita Kilpatrick  2/22/1923  [id number]/[id number]    Date: 12/31/2019      Speech Therapy attempted to see Sharita Kilpatrick on this date for a/an:    Treatment    Pt was unable to be seen due to: Other:    Pt was finished with breakfast tray upon SLP arrival. Pt refused any further PO trials this date. SLP was unable to observe tolerance of current diet due to trial refusals.      Electronically signed by TAMRA Yuan on 12/31/19 at 9:45 AM

## 2020-01-01 ENCOUNTER — TELEPHONE (OUTPATIENT)
Dept: UROLOGY | Age: 85
End: 2020-01-01

## 2020-01-01 ENCOUNTER — HOSPITAL ENCOUNTER (EMERGENCY)
Age: 85
Discharge: HOME | End: 2020-07-19
Attending: FAMILY MEDICINE
Payer: MEDICARE

## 2020-01-01 ENCOUNTER — TELEPHONE (OUTPATIENT)
Dept: SURGERY | Age: 85
End: 2020-01-01

## 2020-01-01 ENCOUNTER — HOSPITAL ENCOUNTER (EMERGENCY)
Age: 85
Discharge: HOME | End: 2020-01-01
Attending: EMERGENCY MEDICINE
Payer: MEDICARE

## 2020-01-01 VITALS
HEIGHT: 72 IN | OXYGEN SATURATION: 98 % | WEIGHT: 150 LBS | RESPIRATION RATE: 18 BRPM | HEART RATE: 55 BPM | BODY MASS INDEX: 20.32 KG/M2 | DIASTOLIC BLOOD PRESSURE: 66 MMHG | TEMPERATURE: 97.9 F | SYSTOLIC BLOOD PRESSURE: 128 MMHG

## 2020-01-01 VITALS
HEART RATE: 84 BPM | HEIGHT: 70 IN | OXYGEN SATURATION: 95 % | BODY MASS INDEX: 22.9 KG/M2 | TEMPERATURE: 98 F | RESPIRATION RATE: 16 BRPM | DIASTOLIC BLOOD PRESSURE: 89 MMHG | SYSTOLIC BLOOD PRESSURE: 148 MMHG | WEIGHT: 160 LBS

## 2020-01-01 VITALS
WEIGHT: 150 LBS | DIASTOLIC BLOOD PRESSURE: 83 MMHG | RESPIRATION RATE: 16 BRPM | TEMPERATURE: 97.8 F | OXYGEN SATURATION: 96 % | SYSTOLIC BLOOD PRESSURE: 142 MMHG | HEART RATE: 68 BPM | HEIGHT: 72 IN | BODY MASS INDEX: 20.32 KG/M2

## 2020-01-01 PROCEDURE — 99282 EMERGENCY DEPT VISIT SF MDM: CPT

## 2020-01-01 PROCEDURE — 51703 INSERT BLADDER CATH COMPLEX: CPT | Performed by: UROLOGY

## 2020-01-01 PROCEDURE — 6370000000 HC RX 637 (ALT 250 FOR IP): Performed by: FAMILY MEDICINE

## 2020-01-01 PROCEDURE — 51702 INSERT TEMP BLADDER CATH: CPT

## 2020-01-01 PROCEDURE — 99283 EMERGENCY DEPT VISIT LOW MDM: CPT | Performed by: UROLOGY

## 2020-01-01 RX ORDER — LIDOCAINE HYDROCHLORIDE 20 MG/ML
JELLY TOPICAL PRN
Status: DISCONTINUED | OUTPATIENT
Start: 2020-01-01 | End: 2020-01-01 | Stop reason: HOSPADM

## 2020-01-01 RX ORDER — LIDOCAINE HYDROCHLORIDE 20 MG/ML
JELLY TOPICAL ONCE
Status: DISCONTINUED | OUTPATIENT
Start: 2020-01-01 | End: 2020-01-01

## 2020-01-01 RX ADMIN — LIDOCAINE HYDROCHLORIDE: 20 JELLY TOPICAL at 10:58

## 2020-01-01 ASSESSMENT — PAIN DESCRIPTION - FREQUENCY: FREQUENCY: INTERMITTENT

## 2020-01-01 ASSESSMENT — PAIN DESCRIPTION - LOCATION: LOCATION: PENIS

## 2020-01-01 ASSESSMENT — ENCOUNTER SYMPTOMS
GASTROINTESTINAL NEGATIVE: 1
RESPIRATORY NEGATIVE: 1
ALLERGIC/IMMUNOLOGIC NEGATIVE: 1
EYES NEGATIVE: 1

## 2020-01-01 ASSESSMENT — PAIN DESCRIPTION - PAIN TYPE: TYPE: ACUTE PAIN

## 2020-01-01 ASSESSMENT — PAIN SCALES - WONG BAKER: WONGBAKER_NUMERICALRESPONSE: 8

## 2020-01-01 ASSESSMENT — PAIN DESCRIPTION - ONSET: ONSET: ON-GOING

## 2020-01-01 NOTE — ED PROVIDER NOTES
[address] [facility] ED  eMERGENCY dEPARTMENT eNCOUnter      Pt Name: Denis Pak  MRN: [id number]  [place] 2/22/1923  Date of evaluation: 1/1/2020  Provider: Jud Juárez MD     CHIEF COMPLAINT       Chief Complaint   Patient presents with    Other     pulled out esposito cathetar, balloon in take          HISTORY OF PRESENT ILLNESS   (Location/Symptom, Timing/Onset,Context/Setting, Quality, Duration, Modifying Factors, Severity) Note limiting factors. Patient is here because he pulled out his Espostio catheter with the balloon intact. The patient cannot answer any questions. Denis Pak is a 80 y.o. male who presents to the emergency department      Nursing Notes were reviewed. REVIEW OF SYSTEMS    (2+ forlevel 4; 10+ for level 5)      Review of Systems   Unable to perform ROS: Dementia       PAST MEDICAL HISTORY     Past Medical History:   Diagnosis Date    Cancer ([facility] [address]     skin CA left ear    Hypertension        SURGICALHISTORY     History reviewed. No pertinent surgical history. CURRENT MEDICATIONS       Discharge Medication List as of 1/1/2020  2:54 PM      CONTINUE these medications which have NOT CHANGED    Details   finasteride (PROSCAR) 5 MG tablet Take 1 tablet by mouth daily, Disp-30 tablet, R-3NO PRINT      tamsulosin (FLOMAX) 0.4 MG capsule Take 1 capsule by mouth daily, Disp-30 capsule, R-3NO PRINT      levothyroxine (SYNTHROID) 100 MCG tablet Take 100 mcg by mouth DailyHistorical Med      valsartan (DIOVAN) 80 MG tablet Take 80 mg by mouth daily Historical Med             ALLERGIES     Patient has no known allergies. FAMILY HISTORY     History reviewed. No pertinent family history. SOCIAL HISTORY       Social History     Socioeconomic History    Marital status:       Spouse name: None    Number of children: None    Years of education: None    Highest education level: None   Occupational History    None   Social Needs    Financial resource strain: None  Food insecurity:     Worry: None     Inability: None    Transportation needs:     Medical: None     Non-medical: None   Tobacco Use    Smoking status: Never Smoker    Smokeless tobacco: Never Used   Substance and Sexual Activity    Alcohol use: Not Currently    Drug use: Never    Sexual activity: Never   Lifestyle    Physical activity:     Days per week: None     Minutes per session: None    Stress: None   Relationships    Social connections:     Talks on phone: None     Gets together: None     Attends Church service: None     Active member of club or organization: None     Attends meetings of clubs or organizations: None     Relationship status: None    Intimate partner violence:     Fear of current or ex partner: None     Emotionally abused: None     Physically abused: None     Forced sexual activity: None   Other Topics Concern    None   Social History Narrative    None       SCREENINGS      @FLOW([id number])@    PHYSICAL EXAM    (5+ for level 4, 8+ for level 5)     ED Triage Vitals [01/01/20 1320]   BP Temp Temp Source Pulse Resp SpO2 Height Weight   (!) 148/89 98 °F (36.7 °C) Oral 84 16 95 % 5' 10\" (1.778 m) 160 lb (72.6 kg)       Physical Exam  Vitals signs and nursing note reviewed. Constitutional:       General: He is not in acute distress. Appearance: Normal appearance. He is well-developed. He is not diaphoretic. HENT:      Head: Normocephalic and atraumatic. Nose: Nose normal.      Mouth/Throat:      Pharynx: No oropharyngeal exudate. Eyes:      General: No scleral icterus. Right eye: No discharge. Left eye: No discharge. Conjunctiva/sclera: Conjunctivae normal.      Pupils: Pupils are equal, round, and reactive to light. Neck:      Musculoskeletal: Normal range of motion and neck supple. Thyroid: No thyromegaly. Vascular: No JVD. Trachea: No tracheal deviation. Cardiovascular:      Rate and Rhythm: Normal rate and regular rhythm.

## 2020-01-02 ENCOUNTER — HOSPITAL ENCOUNTER (EMERGENCY)
Age: 85
Discharge: OTHER INSTITUTION | End: 2020-01-02
Attending: EMERGENCY MEDICINE
Payer: MEDICARE

## 2020-01-02 ENCOUNTER — HOSPITAL ENCOUNTER (OUTPATIENT)
Age: 85
Setting detail: OBSERVATION
Discharge: NURSING, REHAB OR LONG-TERM CARE | End: 2020-01-03
Attending: EMERGENCY MEDICINE | Admitting: STUDENT IN AN ORGANIZED HEALTH CARE EDUCATION/TRAINING PROGRAM
Payer: MEDICARE

## 2020-01-02 VITALS
BODY MASS INDEX: 22.22 KG/M2 | OXYGEN SATURATION: 96 % | RESPIRATION RATE: 18 BRPM | TEMPERATURE: 98.2 F | HEIGHT: 69 IN | HEART RATE: 82 BPM | SYSTOLIC BLOOD PRESSURE: 145 MMHG | WEIGHT: 150 LBS | DIASTOLIC BLOOD PRESSURE: 66 MMHG

## 2020-01-02 LAB
ALBUMIN SERPL-MCNC: 3.2 G/DL (ref 3.5–4.6)
ALP BLD-CCNC: 71 U/L (ref 35–104)
ALT SERPL-CCNC: 38 U/L (ref 0–41)
ANION GAP SERPL CALCULATED.3IONS-SCNC: 16 MEQ/L (ref 9–15)
AST SERPL-CCNC: 92 U/L (ref 0–40)
BASOPHILS ABSOLUTE: 0 K/UL (ref 0–0.2)
BASOPHILS RELATIVE PERCENT: 0.6 %
BILIRUB SERPL-MCNC: 0.5 MG/DL (ref 0.2–0.7)
BUN BLDV-MCNC: 20 MG/DL (ref 8–23)
CALCIUM SERPL-MCNC: 8.6 MG/DL (ref 8.5–9.9)
CHLORIDE BLD-SCNC: 101 MEQ/L (ref 95–107)
CO2: 22 MEQ/L (ref 20–31)
CREAT SERPL-MCNC: 1 MG/DL (ref 0.7–1.2)
EOSINOPHILS ABSOLUTE: 0.1 K/UL (ref 0–0.7)
EOSINOPHILS RELATIVE PERCENT: 1 %
GFR AFRICAN AMERICAN: >60
GFR NON-AFRICAN AMERICAN: >60
GLOBULIN: 3 G/DL (ref 2.3–3.5)
GLUCOSE BLD-MCNC: 132 MG/DL (ref 70–99)
HCT VFR BLD CALC: 24.8 % (ref 42–52)
HEMOGLOBIN: 8.6 G/DL (ref 14–18)
LYMPHOCYTES ABSOLUTE: 0.8 K/UL (ref 1–4.8)
LYMPHOCYTES RELATIVE PERCENT: 9.6 %
MCH RBC QN AUTO: 34.9 PG (ref 27–31.3)
MCHC RBC AUTO-ENTMCNC: 34.8 % (ref 33–37)
MCV RBC AUTO: 100.3 FL (ref 80–100)
MONOCYTES ABSOLUTE: 0.8 K/UL (ref 0.2–0.8)
MONOCYTES RELATIVE PERCENT: 10.3 %
NEUTROPHILS ABSOLUTE: 6.3 K/UL (ref 1.4–6.5)
NEUTROPHILS RELATIVE PERCENT: 78.5 %
PDW BLD-RTO: 14.6 % (ref 11.5–14.5)
PLATELET # BLD: 190 K/UL (ref 130–400)
POTASSIUM SERPL-SCNC: 4.4 MEQ/L (ref 3.4–4.9)
RBC # BLD: 2.47 M/UL (ref 4.7–6.1)
SODIUM BLD-SCNC: 139 MEQ/L (ref 135–144)
TOTAL PROTEIN: 6.2 G/DL (ref 6.3–8)
WBC # BLD: 8 K/UL (ref 4.8–10.8)

## 2020-01-02 PROCEDURE — G0378 HOSPITAL OBSERVATION PER HR: HCPCS

## 2020-01-02 PROCEDURE — 80053 COMPREHEN METABOLIC PANEL: CPT

## 2020-01-02 PROCEDURE — 36415 COLL VENOUS BLD VENIPUNCTURE: CPT

## 2020-01-02 PROCEDURE — 99283 EMERGENCY DEPT VISIT LOW MDM: CPT

## 2020-01-02 PROCEDURE — 96372 THER/PROPH/DIAG INJ SC/IM: CPT

## 2020-01-02 PROCEDURE — 6360000002 HC RX W HCPCS: Performed by: STUDENT IN AN ORGANIZED HEALTH CARE EDUCATION/TRAINING PROGRAM

## 2020-01-02 PROCEDURE — 2580000003 HC RX 258: Performed by: EMERGENCY MEDICINE

## 2020-01-02 PROCEDURE — 85025 COMPLETE CBC W/AUTO DIFF WBC: CPT

## 2020-01-02 PROCEDURE — 96361 HYDRATE IV INFUSION ADD-ON: CPT

## 2020-01-02 PROCEDURE — 99284 EMERGENCY DEPT VISIT MOD MDM: CPT

## 2020-01-02 PROCEDURE — 2580000003 HC RX 258: Performed by: STUDENT IN AN ORGANIZED HEALTH CARE EDUCATION/TRAINING PROGRAM

## 2020-01-02 PROCEDURE — 6360000002 HC RX W HCPCS: Performed by: EMERGENCY MEDICINE

## 2020-01-02 PROCEDURE — 96365 THER/PROPH/DIAG IV INF INIT: CPT

## 2020-01-02 PROCEDURE — 96366 THER/PROPH/DIAG IV INF ADDON: CPT

## 2020-01-02 PROCEDURE — 81001 URINALYSIS AUTO W/SCOPE: CPT

## 2020-01-02 RX ORDER — SODIUM CHLORIDE 0.9 % (FLUSH) 0.9 %
10 SYRINGE (ML) INJECTION EVERY 12 HOURS SCHEDULED
Status: DISCONTINUED | OUTPATIENT
Start: 2020-01-02 | End: 2020-01-03 | Stop reason: HOSPADM

## 2020-01-02 RX ORDER — TAMSULOSIN HYDROCHLORIDE 0.4 MG/1
0.4 CAPSULE ORAL DAILY
Status: DISCONTINUED | OUTPATIENT
Start: 2020-01-02 | End: 2020-01-03 | Stop reason: HOSPADM

## 2020-01-02 RX ORDER — FENTANYL CITRATE 50 UG/ML
50 INJECTION, SOLUTION INTRAMUSCULAR; INTRAVENOUS EVERY 4 HOURS PRN
Status: DISCONTINUED | OUTPATIENT
Start: 2020-01-02 | End: 2020-01-02 | Stop reason: HOSPADM

## 2020-01-02 RX ORDER — ACETAMINOPHEN 325 MG/1
650 TABLET ORAL EVERY 4 HOURS PRN
Status: DISCONTINUED | OUTPATIENT
Start: 2020-01-02 | End: 2020-01-03 | Stop reason: HOSPADM

## 2020-01-02 RX ORDER — SODIUM CHLORIDE 0.9 % (FLUSH) 0.9 %
10 SYRINGE (ML) INJECTION PRN
Status: DISCONTINUED | OUTPATIENT
Start: 2020-01-02 | End: 2020-01-03 | Stop reason: HOSPADM

## 2020-01-02 RX ORDER — LEVOTHYROXINE SODIUM 0.1 MG/1
100 TABLET ORAL DAILY
Status: DISCONTINUED | OUTPATIENT
Start: 2020-01-02 | End: 2020-01-03 | Stop reason: HOSPADM

## 2020-01-02 RX ORDER — 0.9 % SODIUM CHLORIDE 0.9 %
1000 INTRAVENOUS SOLUTION INTRAVENOUS ONCE
Status: COMPLETED | OUTPATIENT
Start: 2020-01-02 | End: 2020-01-02

## 2020-01-02 RX ORDER — VALSARTAN 80 MG/1
80 TABLET ORAL DAILY
Status: DISCONTINUED | OUTPATIENT
Start: 2020-01-02 | End: 2020-01-03 | Stop reason: HOSPADM

## 2020-01-02 RX ORDER — FINASTERIDE 5 MG/1
5 TABLET, FILM COATED ORAL DAILY
Status: DISCONTINUED | OUTPATIENT
Start: 2020-01-02 | End: 2020-01-03 | Stop reason: HOSPADM

## 2020-01-02 RX ORDER — SODIUM CHLORIDE 9 MG/ML
INJECTION, SOLUTION INTRAVENOUS CONTINUOUS
Status: DISCONTINUED | OUTPATIENT
Start: 2020-01-02 | End: 2020-01-03 | Stop reason: HOSPADM

## 2020-01-02 RX ADMIN — ENOXAPARIN SODIUM 40 MG: 40 INJECTION SUBCUTANEOUS at 21:03

## 2020-01-02 RX ADMIN — Medication 10 ML: at 21:04

## 2020-01-02 RX ADMIN — CEFTRIAXONE SODIUM 1 G: 1 INJECTION, POWDER, FOR SOLUTION INTRAMUSCULAR; INTRAVENOUS at 12:22

## 2020-01-02 RX ADMIN — SODIUM CHLORIDE 1000 ML: 9 INJECTION, SOLUTION INTRAVENOUS at 21:57

## 2020-01-02 RX ADMIN — SODIUM CHLORIDE 1000 ML: 9 INJECTION, SOLUTION INTRAVENOUS at 12:23

## 2020-01-02 RX ADMIN — SODIUM CHLORIDE: 9 INJECTION, SOLUTION INTRAVENOUS at 21:58

## 2020-01-02 ASSESSMENT — ENCOUNTER SYMPTOMS
CONSTIPATION: 0
EYE DISCHARGE: 0
VOMITING: 0
ABDOMINAL DISTENTION: 0
BACK PAIN: 0
ABDOMINAL PAIN: 0
SORE THROAT: 0
RHINORRHEA: 0
SHORTNESS OF BREATH: 0
COUGH: 0
DIARRHEA: 0
SORE THROAT: 0
SHORTNESS OF BREATH: 0
NAUSEA: 0
COLOR CHANGE: 0
ABDOMINAL PAIN: 0

## 2020-01-02 ASSESSMENT — PAIN SCALES - GENERAL
PAINLEVEL_OUTOF10: 0

## 2020-01-02 NOTE — ED NOTES
Report called to  Sherif Sung at [facility]. Updated patient and sons.   [facility] ETA 30 min      Newton Chang RN  01/02/20 0700

## 2020-01-02 NOTE — ED NOTES
Patient has an external cath on to test for urinary retention.  Bladder scan revealed 100ml      Bessie Espinal RN  01/02/20 4235

## 2020-01-02 NOTE — ED NOTES
Bed: 18  Expected date: 1/2/20  Expected time: 11:52 AM  Means of arrival: [facility]  Comments:  79 yo male from [facility] 4098 out esposito for 3rd time in 2 days  Vital Signs stable      Haleigh Mueller RN  01/02/20 1206

## 2020-01-02 NOTE — ED PROVIDER NOTES
[address] [facility] ED  eMERGENCYdEPARTMENT eNCOUnter      Pt Name: Rimma Staton  MRN: [id number]  [place] 2/22/1923  Date of evaluation: 1/2/2020  Roland Pacheco MD    CHIEF COMPLAINT           HPI  Rimma Staton is a 80 y.o. male per chart review has a h/o HTN, skin Ca, recent admission for fall, SVT presents to the ED with esposito catheter displacement. Per NH, pt pulled out his esposito catheter just prior to arrival.  Pt denies fever, n/v, cp, sob, dysuria, diarrhea. Pt seen in the ED 2 times previously because he pulled out his catheter. ROS  Review of Systems   Constitutional: Negative for activity change, chills and fever. HENT: Negative for ear pain and sore throat. Eyes: Negative for visual disturbance. Respiratory: Negative for cough and shortness of breath. Cardiovascular: Negative for chest pain, palpitations and leg swelling. Gastrointestinal: Negative for abdominal pain, diarrhea, nausea and vomiting. Genitourinary: Negative for dysuria. Esposito catheter displacement   Musculoskeletal: Negative for back pain. Skin: Negative for rash. Neurological: Negative for dizziness and weakness. Except as noted above the remainder of the review of systems was reviewed and negative. PAST MEDICAL HISTORY     Past Medical History:   Diagnosis Date    Cancer [facility])     skin CA left ear    Hypertension          SURGICAL HISTORY     History reviewed. No pertinent surgical history. CURRENTMEDICATIONS       Previous Medications    FINASTERIDE (PROSCAR) 5 MG TABLET    Take 1 tablet by mouth daily    LEVOTHYROXINE (SYNTHROID) 100 MCG TABLET    Take 100 mcg by mouth Daily    TAMSULOSIN (FLOMAX) 0.4 MG CAPSULE    Take 1 capsule by mouth daily    VALSARTAN (DIOVAN) 80 MG TABLET    Take 80 mg by mouth daily        ALLERGIES     Patient has no known allergies. FAMILY HISTORY     History reviewed. No pertinent family history.        SOCIAL HISTORY       Social History sounds. Abdominal:      General: Bowel sounds are normal. There is no distension. Palpations: Abdomen is soft. Tenderness: There is no tenderness. Musculoskeletal: Normal range of motion. Skin:     General: Skin is warm and dry. Neurological:      Mental Status: He is alert and oriented to person, place, and time. MDM  81 yo male presents to the ED with esposito catheter displacement. Pt is afebrile, hemodynamically stable. Given that pt has pulled out his esposito multiple times in the past 2-3 days, pt given 1 L NS, IV rocephin in the ED for presumed UTI. Labs only remarkable for glucose 132, Hb 8.6. Esposito unable to be placed in the ED due to hematuria and obstruction of the catheter. Suspect urethral/bladder injury when pt displaced his catheter. Bladder scan showed less than 100 ccs of urine. Pt was recently admitted for renal failure and found to have urinary retention. Case discussed with Dr. Bernard Harrison who recommended admission for likely esposito catheter placement tomorrow. Case then discussed with Dr. Filiberto Fierro and pt admitted to medicine for obs for hematuria, urinary retention. FINAL IMPRESSION      1. Urinary retention    2.  Hematuria, unspecified type          DISPOSITION/PLAN   DISPOSITION Decision To Admit 01/02/2020 02:56:43 PM        DISCHARGE MEDICATIONS:  [unfilled]         Meaghan Drake MD(electronically signed)  Attending Emergency Physician            Meaghan Drake MD  01/02/20 0306

## 2020-01-02 NOTE — PROGRESS NOTES
Pt arrived to unit. Pt alert. Very hard of hearing. Unable to see enough to read off paper. Very difficult to assess admission questions. Pt states that his son's will answer anything. Son Nikole Ashton listed as previous POA in chart. Skin intact. Vs stable. Lungs clear. Denies pain at this time. External catheter in place. Perfect served Dr. Guillermo Dorantes, pt is DNR-CCA and previously on pureed/ nectar thick diet at [facility]. Takes pills crushed. Spoke with Nurse Daphnie Botello- faxed REHAN. Reviewed medications.

## 2020-01-02 NOTE — ED TRIAGE NOTES
Patient is hard of hearing denies any pain. No temp.  Lungs clear On Abx per ID/Exception to protocol

## 2020-01-02 NOTE — ED PROVIDER NOTES
[address] [facility] ED  eMERGENCY dEPARTMENT eNCOUnter      Pt Name: Charli Ramirez  MRN: [id number]  [place] 2/22/1923  Date of evaluation: 1/2/2020  Provider: Denise Gaston PA-C    CHIEF COMPLAINT       Chief Complaint   Patient presents with    Other     patient pulled out esposito catheter         HISTORY OF PRESENT ILLNESS   (Location/Symptom, Timing/Onset,Context/Setting, Quality, Duration, Modifying Factors, Severity)  Note limiting factors. Charli Ramirez is a 80 y.o. male who presents to the emergency department complaint patient accidentally pulled out his Esposito catheter. It is from placement of implant, he was transported by EMS staff was unable to reestablish Esposito catheter after patient pulled it out. Patient has no other additional complaints. HPI    NursingNotes were reviewed. REVIEW OF SYSTEMS    (2-9 systems for level 4, 10 or more for level 5)     Review of Systems   Constitutional: Negative for activity change and appetite change. HENT: Negative for congestion, ear discharge, ear pain, nosebleeds, rhinorrhea and sore throat. Eyes: Negative for discharge. Respiratory: Negative for shortness of breath. Cardiovascular: Negative for chest pain, palpitations and leg swelling. Gastrointestinal: Negative for abdominal distention, abdominal pain and constipation. Genitourinary: Negative for difficulty urinating, dysuria, flank pain, hematuria and urgency. Esposito catheter accidentally dislodged. Musculoskeletal: Negative for arthralgias. Skin: Negative for color change, pallor and wound. Neurological: Negative for dizziness, tremors, syncope, weakness, numbness and headaches. Psychiatric/Behavioral: Negative for agitation and confusion. Except as noted above the remainder of the review of systems was reviewed and negative.        PAST MEDICAL HISTORY     Past Medical History:   Diagnosis Date    Cancer [facility])     skin CA left ear    Hypertension TempSrc: Oral   SpO2: 97%   Weight: 150 lb (68 kg)   Height: 5' 9\" (1.753 m)          MDM  Number of Diagnoses or Management Options  Dislodged Blanco catheter, initial encounter [facility]):   Diagnosis management comments: Blanco catheter was reinserted by nursing staff patient will be transported back to facility after accidental dislodgment of Blanco catheter. Catheter is draining clear yellow urine mildly blood-tinged no clots are noted no obstruction. Patient has no pain on palpation to the abdomen or groin. Patient returned back to nursing facility and stable condition. CRITICAL CARE TIME   Total Critical Care time was 0 minutes, excluding separately reportableprocedures. There was a high probability of clinicallysignificant/life threatening deterioration in the patient's condition which required my urgent intervention. CONSULTS:  None    PROCEDURES:  Unless otherwise noted below, none     Procedures    FINAL IMPRESSION      1.  Dislodged Blanco catheter, initial encounter [facility])          DISPOSITION/PLAN   DISPOSITION        PATIENT REFERRED TO:  Tam Gonzalez, DO  [address]  [address]  [address] [facility] [id number] [phone]    In 2 days        DISCHARGE MEDICATIONS:  New Prescriptions    No medications on file          (Please note that portions of this note were completed with a voice recognition program.  Efforts were made to edit the dictations but occasionally words are mis-transcribed.)    Ed Johnson PA-C (electronically signed)  Attending Emergency Physician         Ed Johnson PA-C  01/02/20 [address] Gustabo Hayes PA-C  01/02/20 1180

## 2020-01-03 ENCOUNTER — TELEPHONE (OUTPATIENT)
Dept: UROLOGY | Age: 85
End: 2020-01-03

## 2020-01-03 VITALS
TEMPERATURE: 98.6 F | HEART RATE: 72 BPM | HEIGHT: 68 IN | DIASTOLIC BLOOD PRESSURE: 53 MMHG | WEIGHT: 149.03 LBS | BODY MASS INDEX: 22.59 KG/M2 | OXYGEN SATURATION: 100 % | RESPIRATION RATE: 15 BRPM | SYSTOLIC BLOOD PRESSURE: 100 MMHG

## 2020-01-03 LAB
BACTERIA: NEGATIVE /HPF
BILIRUBIN URINE: NEGATIVE
BLOOD, URINE: ABNORMAL
CLARITY: ABNORMAL
COLOR: ABNORMAL
EPITHELIAL CELLS, UA: ABNORMAL /HPF (ref 0–5)
GLUCOSE URINE: NEGATIVE MG/DL
HYALINE CASTS: ABNORMAL /HPF (ref 0–5)
KETONES, URINE: NEGATIVE MG/DL
LEUKOCYTE ESTERASE, URINE: NEGATIVE
NITRITE, URINE: NEGATIVE
PH UA: 5.5 (ref 5–9)
PROTEIN UA: 100 MG/DL
RBC UA: >100 /HPF (ref 0–5)
SPECIFIC GRAVITY UA: 1.01 (ref 1–1.03)
UROBILINOGEN, URINE: 0.2 E.U./DL
WBC UA: ABNORMAL /HPF (ref 0–5)

## 2020-01-03 PROCEDURE — G0008 ADMIN INFLUENZA VIRUS VAC: HCPCS | Performed by: STUDENT IN AN ORGANIZED HEALTH CARE EDUCATION/TRAINING PROGRAM

## 2020-01-03 PROCEDURE — 90686 IIV4 VACC NO PRSV 0.5 ML IM: CPT | Performed by: STUDENT IN AN ORGANIZED HEALTH CARE EDUCATION/TRAINING PROGRAM

## 2020-01-03 PROCEDURE — 6360000002 HC RX W HCPCS: Performed by: STUDENT IN AN ORGANIZED HEALTH CARE EDUCATION/TRAINING PROGRAM

## 2020-01-03 PROCEDURE — 51703 INSERT BLADDER CATH COMPLEX: CPT | Performed by: UROLOGY

## 2020-01-03 PROCEDURE — 99214 OFFICE O/P EST MOD 30 MIN: CPT | Performed by: UROLOGY

## 2020-01-03 PROCEDURE — 6370000000 HC RX 637 (ALT 250 FOR IP): Performed by: STUDENT IN AN ORGANIZED HEALTH CARE EDUCATION/TRAINING PROGRAM

## 2020-01-03 PROCEDURE — G0378 HOSPITAL OBSERVATION PER HR: HCPCS

## 2020-01-03 RX ADMIN — LEVOTHYROXINE SODIUM 100 MCG: 100 TABLET ORAL at 06:12

## 2020-01-03 RX ADMIN — INFLUENZA A VIRUS A/BRISBANE/02/2018 IVR-190 (H1N1) ANTIGEN (PROPIOLACTONE INACTIVATED), INFLUENZA A VIRUS A/KANSAS/14/2017 X-327 (H3N2) ANTIGEN (PROPIOLACTONE INACTIVATED), INFLUENZA B VIRUS B/MARYLAND/15/2016 ANTIGEN (PROPIOLACTONE INACTIVATED), INFLUENZA B VIRUS B/PHUKET/3073/2013 BVR-1B ANTIGEN (PROPIOLACTONE INACTIVATED) 0.5 ML: 15; 15; 15; 15 INJECTION, SUSPENSION INTRAMUSCULAR at 14:50

## 2020-01-03 ASSESSMENT — PAIN SCALES - GENERAL
PAINLEVEL_OUTOF10: 0
PAINLEVEL_OUTOF10: 0

## 2020-01-03 NOTE — H&P
Department of Internal Medicine  Nephrology  [facility]  Attending History and Physical      CHIEF COMPLAINT:  Urinary retention    Reason for Admission:  Urinary retention w/ need for esposito placement    History Obtained From:  electronic medical record    HISTORY OF PRESENT ILLNESS:    80y.o. year old male with history s/f HTN, urinary retention s/p esposito catheter placement, skin cancer LT ear who presented for urinary retention. Pt had pulled out catheter 3 times while there. When he came to the ED, the catheter was unable to be placed so pt seen by urology. Plan for catheter placement today. Pt also hypotensive last night requiring fluid bolus and maintenance fluids at 75 ml. BP improving. Past Medical History:        Diagnosis Date    Cancer [facility])     skin CA left ear    Hypertension        Past Surgical History:    History reviewed. No pertinent surgical history. Medications Prior to Admission:    Medications Prior to Admission: finasteride (PROSCAR) 5 MG tablet, Take 1 tablet by mouth daily  tamsulosin (FLOMAX) 0.4 MG capsule, Take 1 capsule by mouth daily  valsartan (DIOVAN) 80 MG tablet, Take 80 mg by mouth daily   levothyroxine (SYNTHROID) 100 MCG tablet, Take 100 mcg by mouth Daily    Allergies:  Aripiprazole    Social History:   Social History     Tobacco History     Smoking Status  Never Smoker    Smokeless Tobacco Use  Never Used          Alcohol History     Alcohol Use Status  Not Currently          Drug Use     Drug Use Status  Never          Sexual Activity     Sexually Active  Never                Family History:   History reviewed. No pertinent family history.     REVIEW OF SYSTEMS:  Positives in bold  Constitutional: fever, chills, fatigue, malaise   HENT:  rhinorrhea, sinus pain, sore throat, epistaxis, hard of hearing  Eyes:  photophobia, visual disturbance, eye redness  Respiratory: shortness of breath, cough, hemoptysis    Cardiovascular: chest pain, palpitations, GLUCOSE 132 01/02/2020    GLUCOSE 99 12/27/2011       ASSESSMENT AND PLAN:    80y.o. year old male with history s/f HTN, urinary retention s/p esposito catheter placement, skin cancer LT and hypothyroidism ear who presented for urinary retention. 1. Urinary retention: recently pulled out esposito x3, plan for placement again today by Dr. Dez Gutierrez   2. Hypotension: ? Infection, no fever or leukocytosis, held his BP medications, giving fluids, if remains hypotensive, will check blood cultures  3.  Hypothyroidism: continue Rocael Parnell MD

## 2020-01-03 NOTE — PROGRESS NOTES
Spoke with son about pts pureed diet due to providing pt with soft candy. Stated that he eats them all the time at the nursing home.

## 2020-01-03 NOTE — DISCHARGE SUMMARY
Physician Discharge Summary     Patient ID:  Janene Yusuf  [id number]  13 y.o.  2/22/1923    Admit date: 1/2/2020    Discharge date and time: 1/3/2020 at 2:36 pm      Admitting Physician: Patricia Fox MD     Discharge Physician: Patricia Fox MD    Admission Diagnoses: Urinary retention [R33.9]    Discharge Diagnoses: Urinary retention, hematuria    Admission Condition: fair    Discharged Condition: fair    Indication for Admission: urinary retention, need for esposito insertion    Hospital Course: had pulled esposito out 3 times at NH, sustained hematuria due to the trauma, unable to void > 500 ml w/o esposito, seen by Dr. Jarek Johnston w/ replacement of esposito catheter. Pt hypotensive here as well, no fever or leukocytosis, improved w/ fluids. Held valsartan on discharge     Consults: urology    Significant Diagnostic Studies: bladder scan showing elevated PVRs    Treatments: esposito catheter placement, fluids    Discharge Exam:  General: alert, in no apparent distress  HEENT: normocephalic, LT facial scar, anicteric, hard of hearing  Neck: supple, no mass  Lungs: non-labored respirations, clear to auscultation bilaterally  Heart: regular rate and rhythm, no murmurs or rubs  Abdomen: soft, non-tender, non-distended  MSK: no joint swelling or tenderness  Ext: no cyanosis, no peripheral edema  Neuro: alert and oriented, no gross abnormalities  Psych: normal mood and affect  Skin: no rash       Disposition: SNF    In process/preliminary results:  Outstanding Order Results     No orders found for last 30 day(s).           Patient Instructions:   Current Discharge Medication List      CONTINUE these medications which have NOT CHANGED    Details   finasteride (PROSCAR) 5 MG tablet Take 1 tablet by mouth daily  Qty: 30 tablet, Refills: 3      tamsulosin (FLOMAX) 0.4 MG capsule Take 1 capsule by mouth daily  Qty: 30 capsule, Refills: 3      levothyroxine (SYNTHROID) 100 MCG tablet Take 100 mcg by mouth Daily         STOP taking these medications       valsartan (DIOVAN) 80 MG tablet Comments:   Reason for Stopping:             Discharge back to SNF    Signed:  Gemma Nielsen MD    1/3/2020  2:36 PM

## 2020-01-03 NOTE — CONSULTS
[facility] [address] INPATIENT  CONSULTATION NOTE                                                                                                                                                                                                Reason for Consult  Hematuria secondary to self-induced trauma by pulling on Blanco    History of Present Illness  80year-old nursing home resident with chronic indwelling Blanco admitted through the ER yesterday after pulling his Blanco out with the balloon insufflated  Multiple attempts by ER staff to replace Blanco failed  Patient will undergo trial of void and placement of Blanco  Patient unable to void greater than 500 cc in bladder  22 [place] [employer] tip catheter placed in the bladder 50 cc placed in the balloon up to 30 cc balloon      Urologic Review of Systems/Symptoms  Other Urologic: Self-induced catheter trauma by patient pulling on catheter    Review of Systems  Head and neck: No issues/reviewed  Cardiac: No recent issues/reviewed  Pulmonary: No issues/reviewed  Gastrointestinal: No issues/reviewed  Neurologic: No recent issues/reviewed  Extremities: No issues/reviewed  Lymphatics: No lymphadenopathy no change  Genitourinary: See above  Skin: No issues/reviewed  Hospitalization: Nursing home resident  All 14 categories of Review of Systems otherwise reviewed no other findings reported. Past Medical History:   Diagnosis Date    Cancer [facility])     skin CA left ear    Hypertension      History reviewed. No pertinent surgical history. Social History     Socioeconomic History    Marital status:       Spouse name: None    Number of children: None    Years of education: None    Highest education level: None   Occupational History    None   Social Needs    Financial resource strain: None    Food insecurity:     Worry: None     Inability: None    Transportation needs:     Medical: None     Non-medical: None   Tobacco Use    Smoking status: Never

## 2020-01-03 NOTE — DISCHARGE INSTR - COC
Encounters:   01/03/20 149 lb 0.5 oz (67.6 kg)     Mental Status:  oriented, alert and able to concentrate and follow conversation    IV Access:  - None    Nursing Mobility/ADLs:  Walking   Assisted  Transfer  Assisted  Bathing  [address]   crushed    Wound Care Documentation and Therapy:        Elimination:  Continence:   · Bowel: No  · Bladder: No  Urinary Catheter: Insertion Date: 1/3/2020   Colostomy/Ileostomy/Ileal Conduit: {YES / LR:[id number]}       Date of Last BM: 1/3/2020    Intake/Output Summary (Last 24 hours) at 1/3/2020 1500  Last data filed at 1/3/2020 0614  Gross per 24 hour   Intake 1961 ml   Output 0 ml   Net 1961 ml     I/O last 3 completed shifts: In: 1961 [P.O.:480; I.V.:1481]  Out: 0     Safety Concerns: At Risk for Falls    Impairments/Disabilities:      Vision and Hearing    Nutrition Therapy:  Current Nutrition Therapy:   - Oral Diet:  Renal pureed      Routes of Feeding: Oral  Liquids: Nectar Thick Liquids  Daily Fluid Restriction: no  Last Modified Barium Swallow with Video (Video Swallowing Test): not done    Treatments at the Time of Hospital Discharge:   Respiratory Treatments: N/a  Oxygen Therapy:  is not on home oxygen therapy.   Ventilator:    {MH CC Vent RZY:[id number]}    Rehab Therapies: Physical Therapy and Occupational Therapy  Weight Bearing Status/Restrictions: No weight bearing restirctions  Other Medical Equipment (for information only, NOT a DME order):  n/a  Other Treatments: ***    Patient's personal belongings (please select all that are sent with patient):  Glasses    RN SIGNATURE:  Electronically signed by Abdirizak Baer RN on 1/4/69 at 3:05 PM    CASE MANAGEMENT/SOCIAL WORK SECTION    Inpatient Status Date: ***    Readmission Risk Assessment Score:  Readmission Risk              Risk of Unplanned Readmission:        12           Discharging to Facility/ Agency · Name:   · Address:  · Phone:  · Fax:    Dialysis Facility (if applicable)   · Name:  · Address:  · Dialysis Schedule:  · Phone:  · Fax:    / signature: {Esignature:[id number]}    PHYSICIAN SECTION    Prognosis: Fair    Condition at Discharge: Stable    Rehab Potential (if transferring to Rehab): Fair    Recommended Labs or Other Treatments After Discharge: None, holding valsartan    Physician Certification: I certify the above information and transfer of Melo Lovell  is necessary for the continuing treatment of the diagnosis listed and that he requires [facility] for greater 30 days.      Update Admission H&P: No change in H&P    PHYSICIAN SIGNATURE:  Electronically signed by Valarie Ruiz MD on 1/3/20 at 3:07 PM

## 2020-01-03 NOTE — CARE COORDINATION
Spoke to [facility] who will accept pt back. Transport scheduled for 4:30 pm. Gilda Cabrera notified.

## 2020-01-03 NOTE — PROGRESS NOTES
[facility] [address] to  some time after 16:30

## 2020-01-06 ENCOUNTER — OFFICE VISIT (OUTPATIENT)
Dept: GERIATRIC MEDICINE | Age: 85
End: 2020-01-06
Payer: MEDICARE

## 2020-01-06 LAB
BACTERIA: NEGATIVE /HPF
BILIRUBIN URINE: NEGATIVE
BLOOD, URINE: ABNORMAL
CLARITY: CLEAR
COLOR: YELLOW
EPITHELIAL CELLS, UA: ABNORMAL /HPF (ref 0–5)
GLUCOSE URINE: NEGATIVE MG/DL
HYALINE CASTS: ABNORMAL /HPF (ref 0–5)
KETONES, URINE: NEGATIVE MG/DL
LEUKOCYTE ESTERASE, URINE: ABNORMAL
NITRITE, URINE: NEGATIVE
PH UA: 7.5 (ref 5–9)
PROTEIN UA: 100 MG/DL
RBC UA: >100 /HPF (ref 0–5)
SPECIFIC GRAVITY UA: 1.01 (ref 1–1.03)
UROBILINOGEN, URINE: 0.2 E.U./DL
WBC UA: ABNORMAL /HPF (ref 0–5)

## 2020-01-06 PROCEDURE — 99306 1ST NF CARE HIGH MDM 50: CPT | Performed by: FAMILY MEDICINE

## 2020-01-06 PROCEDURE — 1123F ACP DISCUSS/DSCN MKR DOCD: CPT | Performed by: FAMILY MEDICINE

## 2020-01-06 PROCEDURE — G8482 FLU IMMUNIZE ORDER/ADMIN: HCPCS | Performed by: FAMILY MEDICINE

## 2020-01-07 ENCOUNTER — OFFICE VISIT (OUTPATIENT)
Dept: GERIATRIC MEDICINE | Age: 85
End: 2020-01-07
Payer: MEDICARE

## 2020-01-07 LAB
HCT VFR BLD CALC: 20.1 % (ref 42–52)
HEMOGLOBIN: 6.9 G/DL (ref 14–18)
MCH RBC QN AUTO: 34.7 PG (ref 27–31.3)
MCHC RBC AUTO-ENTMCNC: 34 % (ref 33–37)
MCV RBC AUTO: 102 FL (ref 80–100)
PDW BLD-RTO: 14.5 % (ref 11.5–14.5)
PLATELET # BLD: 211 K/UL (ref 130–400)
RBC # BLD: 1.97 M/UL (ref 4.7–6.1)
T4 FREE: 1.15 NG/DL (ref 0.84–1.68)
TSH SERPL DL<=0.05 MIU/L-ACNC: 12.03 UIU/ML (ref 0.44–3.86)
VITAMIN D 25-HYDROXY: 25 NG/ML (ref 30–100)
WBC # BLD: 5.6 K/UL (ref 4.8–10.8)

## 2020-01-07 PROCEDURE — 99309 SBSQ NF CARE MODERATE MDM 30: CPT | Performed by: NURSE PRACTITIONER

## 2020-01-08 LAB — URINE CULTURE, ROUTINE: NORMAL

## 2020-01-09 VITALS
TEMPERATURE: 98 F | SYSTOLIC BLOOD PRESSURE: 139 MMHG | DIASTOLIC BLOOD PRESSURE: 83 MMHG | HEART RATE: 61 BPM | RESPIRATION RATE: 18 BRPM | OXYGEN SATURATION: 94 %

## 2020-01-09 PROBLEM — R26.2 DIFFICULTY IN WALKING: Status: ACTIVE | Noted: 2020-01-09

## 2020-01-09 PROBLEM — D64.9 ABSOLUTE ANEMIA: Status: ACTIVE | Noted: 2020-01-09

## 2020-01-09 PROBLEM — M62.81 MUSCLE WEAKNESS (GENERALIZED): Status: ACTIVE | Noted: 2020-01-09

## 2020-01-09 NOTE — PROGRESS NOTES
daily    levothyroxine (SYNTHROID) 100 MCG tablet, Take 100 mcg by mouth Daily    Review of Systems Pertinent positives as noted in HPI. Objective:   /83   Pulse 61   Temp 98 °F (36.7 °C)   Resp 18   SpO2 94%     Physical Exam  Constitutional:       General: He is not in acute distress. Appearance: He is underweight. He is ill-appearing. HENT:      Head: Normocephalic. Nose: Nose normal.      Mouth/Throat:      Mouth: Mucous membranes are moist.   Eyes:      Extraocular Movements: Extraocular movements intact. Conjunctiva/sclera: Conjunctivae normal.      Pupils: Pupils are equal, round, and reactive to light. Neck:      Musculoskeletal: Normal range of motion. Thyroid: No thyromegaly. Vascular: No JVD. Trachea: No tracheal deviation. Cardiovascular:      Rate and Rhythm: Normal rate and regular rhythm. Pulses: Normal pulses. Heart sounds: Normal heart sounds. Pulmonary:      Effort: Pulmonary effort is normal. No respiratory distress. Breath sounds: Normal breath sounds. No wheezing or rales. Abdominal:      General: Abdomen is flat. Bowel sounds are normal. There is no distension. Palpations: Abdomen is soft. There is no mass. Tenderness: There is no tenderness. There is no guarding. Musculoskeletal: Normal range of motion. Skin:     General: Skin is warm and dry. Coloration: Skin is pale. Neurological:      Mental Status: He is alert. Mental status is at baseline. Psychiatric:         Mood and Affect: Mood normal.         Behavior: Behavior normal. Behavior is cooperative. Thought Content: Thought content normal.        Diagnosis Orders   1. Other iron deficiency anemia     2. Gross hematuria     3. Urinary retention     4. Difficulty in walking     5. Muscle weakness (generalized)           Assessment and Plan:      1. Other iron deficiency anemia / Gross hematuria:   Blanco draining yellow urine now.   Start Ferrous Sulfate 325 mg twice daily with meals. CBC, BMP Friday. 2.  Urinary retention: Blanco catherer to CD. Continue as ordered. 3.  Difficulty in walking / Muscle weakness (generalized): Walking has improved, still weakness. Continue PT/OT as ordered. Reviewed labs:  Yes    Time based visit:  time spent 30 minutes  >50% spent with counseling and coordination of care. Discussed current clinical condition, abnormal diagnostic results, other suggested diagnostic testing, prognosis, risk and benefits of treatment options, importance of compliance with treatment options, risk factor reduction, and family education. I have reviewed the patient's medical history in detail and updated the computerized patient record.     Zackery Cabrera, APRN-CNP

## 2020-01-10 ENCOUNTER — OFFICE VISIT (OUTPATIENT)
Dept: GERIATRIC MEDICINE | Age: 85
End: 2020-01-10
Payer: MEDICARE

## 2020-01-10 PROCEDURE — 99308 SBSQ NF CARE LOW MDM 20: CPT | Performed by: NURSE PRACTITIONER

## 2020-01-11 LAB — POTASSIUM SERPL-SCNC: 4.8 MEQ/L (ref 3.4–4.9)

## 2020-01-12 LAB
ALBUMIN SERPL-MCNC: 3.2 G/DL (ref 3.5–4.6)
ALP BLD-CCNC: 94 U/L (ref 35–104)
ALT SERPL-CCNC: 14 U/L (ref 0–41)
ANION GAP SERPL CALCULATED.3IONS-SCNC: 13 MEQ/L (ref 9–15)
AST SERPL-CCNC: 17 U/L (ref 0–40)
BASOPHILS ABSOLUTE: 0 K/UL (ref 0–0.2)
BASOPHILS RELATIVE PERCENT: 1 %
BILIRUB SERPL-MCNC: <0.2 MG/DL (ref 0.2–0.7)
BUN BLDV-MCNC: 26 MG/DL (ref 8–23)
CALCIUM SERPL-MCNC: 8.4 MG/DL (ref 8.5–9.9)
CHLORIDE BLD-SCNC: 99 MEQ/L (ref 95–107)
CO2: 24 MEQ/L (ref 20–31)
CREAT SERPL-MCNC: 1.11 MG/DL (ref 0.7–1.2)
EOSINOPHILS ABSOLUTE: 0.1 K/UL (ref 0–0.7)
EOSINOPHILS RELATIVE PERCENT: 2.9 %
GFR AFRICAN AMERICAN: >60
GFR NON-AFRICAN AMERICAN: >60
GLOBULIN: 3.6 G/DL (ref 2.3–3.5)
GLUCOSE BLD-MCNC: 104 MG/DL (ref 70–99)
HCT VFR BLD CALC: 20.8 % (ref 42–52)
HEMOGLOBIN: 7 G/DL (ref 14–18)
LYMPHOCYTES ABSOLUTE: 1 K/UL (ref 1–4.8)
LYMPHOCYTES RELATIVE PERCENT: 22.3 %
MAGNESIUM: 2 MG/DL (ref 1.7–2.4)
MCH RBC QN AUTO: 34.2 PG (ref 27–31.3)
MCHC RBC AUTO-ENTMCNC: 33.7 % (ref 33–37)
MCV RBC AUTO: 101.5 FL (ref 80–100)
MONOCYTES ABSOLUTE: 0.4 K/UL (ref 0.2–0.8)
MONOCYTES RELATIVE PERCENT: 9.5 %
NEUTROPHILS ABSOLUTE: 3 K/UL (ref 1.4–6.5)
NEUTROPHILS RELATIVE PERCENT: 64.3 %
PDW BLD-RTO: 14 % (ref 11.5–14.5)
PHOSPHORUS: 3.3 MG/DL (ref 2.3–4.8)
PLATELET # BLD: 294 K/UL (ref 130–400)
POTASSIUM SERPL-SCNC: 4.2 MEQ/L (ref 3.4–4.9)
POTASSIUM SERPL-SCNC: 4.3 MEQ/L (ref 3.4–4.9)
RBC # BLD: 2.05 M/UL (ref 4.7–6.1)
SODIUM BLD-SCNC: 136 MEQ/L (ref 135–144)
TOTAL PROTEIN: 6.8 G/DL (ref 6.3–8)
WBC # BLD: 4.7 K/UL (ref 4.8–10.8)

## 2020-01-20 VITALS
DIASTOLIC BLOOD PRESSURE: 60 MMHG | HEART RATE: 68 BPM | SYSTOLIC BLOOD PRESSURE: 124 MMHG | RESPIRATION RATE: 18 BRPM | OXYGEN SATURATION: 95 % | TEMPERATURE: 98 F

## 2020-01-20 PROBLEM — E87.5 HYPERKALEMIA: Status: ACTIVE | Noted: 2020-01-20

## 2020-01-20 PROBLEM — B35.1 ONYCHOMYCOSIS: Status: ACTIVE | Noted: 2020-01-20

## 2020-01-20 NOTE — PROGRESS NOTES
normal.      Palpations: Abdomen is soft. Musculoskeletal:      Right lower leg: No edema. Left lower leg: No edema. Skin:     General: Skin is warm and dry. Neurological:      Mental Status: He is alert. Motor: Weakness present. Gait: Gait abnormal.   Psychiatric:         Mood and Affect: Mood normal.         Behavior: Behavior normal.            Diagnosis Orders   1. Other iron deficiency anemia     2. Hyperkalemia     3. Onychomycosis         Assessment and Plan:      1. Other iron deficiency anemia:  H/H at baseline. Continue iron supplementation twice daily as ordered. 2. Hyperkalemia:  Potassium level is 5.4. Kayexalate 15 gms po x 1 today. Potassium level in am.    3. Onychomycosis:  Podiatry consult      Reviewed labs:  Yes    I have reviewed the patient's medical history in detail and updated the computerized patient record.     MELISSA Lozoya-CNP

## 2020-01-21 ENCOUNTER — OFFICE VISIT (OUTPATIENT)
Dept: GERIATRIC MEDICINE | Age: 85
End: 2020-01-21
Payer: MEDICARE

## 2020-01-21 PROCEDURE — 99316 NF DSCHRG MGMT 30 MIN+: CPT | Performed by: NURSE PRACTITIONER

## 2020-01-21 PROCEDURE — G8482 FLU IMMUNIZE ORDER/ADMIN: HCPCS | Performed by: NURSE PRACTITIONER

## 2020-01-22 VITALS
RESPIRATION RATE: 18 BRPM | OXYGEN SATURATION: 96 % | SYSTOLIC BLOOD PRESSURE: 125 MMHG | TEMPERATURE: 98.7 F | DIASTOLIC BLOOD PRESSURE: 66 MMHG | HEART RATE: 50 BPM

## 2020-01-22 PROBLEM — Z97.8 FOLEY CATHETER IN PLACE: Status: ACTIVE | Noted: 2020-01-22

## 2020-01-22 PROBLEM — D50.9 IRON DEFICIENCY ANEMIA: Status: ACTIVE | Noted: 2020-01-22

## 2020-01-22 NOTE — PROGRESS NOTES
Name: Zonia Rivera: [facility]  [address]   Fara godinez  Date: 1/21/2020     Subjective:     Chief Complaint   Patient presents with    Other     evaluation for d/c home    Anemia    Extremity Weakness    Difficulty Walking       HPI  Denis Pak is a 80 y.o. male being seen today for evaluation prior to discharge home Thursday. He has been participating in physical therapy and has made progress. Resident ambulates with cane and FWW, CGA for 50 feet, transfers CGA, steps CGA. Physical therapy recommend 24 hour assistane initially with [facility] and then gradually wean away of resident progresses. Resident lives at home with his son; recommend private care when son is at work. He has low H/H that is being treated with iron supplementation twice daily. His sons want to be conservative with treatment. Resident denies iron transfusions or any other treatment d/t age but agreed to iron pills. Will continue to monitor levels and make adjustments to medications as needed. Resident has h/o left ear cancer with surgical treatmen; wound has healed. Vital signs are stable. He denies c/o dizziness, lightheadedness, fatigue more than usual for his age, chest pain, chest palpitations, shortness of breath, nausea, vomiting, diarrhea, or constipation. Toenails are long and need to be trimmed by podiatry. Resident agrees with this. He may need to have podiatry appointment post discharge. Past Medical History:   Diagnosis Date    Cancer [facility])     skin CA left ear    Hypertension        Allergies:  Patient has no known allergies. Medications:  Reviewed and reconciled in nursing facility records    Review of Systems Pertinent positives as noted in HPI. Objective:   /66   Pulse 50   Temp 98.7 °F (37.1 °C)   Resp 18   SpO2 96%     Physical Exam  HENT:      Head: Normocephalic.         Ears:      Comments: [place]     Mouth/Throat:      Mouth: Mucous membranes are moist.   Eyes:      Extraocular Movements: Extraocular movements intact. Conjunctiva/sclera: Conjunctivae normal.   Neck:      Musculoskeletal: Normal range of motion and neck supple. Cardiovascular:      Rate and Rhythm: Normal rate and regular rhythm. Pulmonary:      Effort: Pulmonary effort is normal.      Breath sounds: Normal breath sounds. Abdominal:      General: Bowel sounds are normal.      Palpations: Abdomen is soft. Musculoskeletal:      Right lower leg: No edema. Left lower leg: No edema. Skin:     General: Skin is warm and dry. Neurological:      Mental Status: He is alert. Motor: Weakness present. Gait: Gait abnormal.   Psychiatric:         Mood and Affect: Mood normal.         Behavior: Behavior normal.            Diagnosis Orders   1. Difficulty in walking     2. Muscle weakness (generalized)     3. Iron deficiency anemia, unspecified iron deficiency anemia type     4. Urinary retention     5. Esposito catheter in place     6. Hyperkalemia     7. Onychomycosis         Assessment and Plan:      1. Other iron deficiency anemia:  H/H at baseline from admission. Continue iron supplementation twice daily as ordered. 2.  Muscle weakness / Difficulty walking:  Discharge with [place] [address]. Continue PT/OT post discharge. D/C with Home health aide for assistance with ADL's.    3. Urinary Retention / Esposito catheter in place:  Skilled nursing with [place] [address] post discharge. Instruct family and resident on esposito care. 4. Hyperkalemia:  Potassium level is normal now. Resolved. 5. Onychomycosis:  Podiatry consult post discharge. Reviewed labs:  Yes      Discharge >31 minutes        Referral to Home Health:   Documentation of Face to Face Encounter   Certification statement    Name: Bambi Ferguson: Naeem Dooley  Date: 2020                   : 1923    I completed a face-to-face encounter on 2020 with the above named patient.   In this encounter a medical condition was addressed, which is the primary reason for home health care. Based on my findings, the following services are medically necessary (Check all the apply):    [x] Skilled Nursing [] Speech Language Pathology [x] Physical Therapy    [] MSW  [x] [address] [facility])  [x] Occupational Therapy    The following specific clinical findings (not the diagnosis) support the need for skilled services as selected above:    Difficulty walking without assistance, generalized weakness, unable to complete ADL's without assistance, needs 24 hour care for ADL's, mobiliy, and transfers. Unable to perform esposito catheter care independently. The follow findings support that this patient is homebound including the need for any assistance of others and he use of Assistive Devices - what makes leaving home a considerable and taxing effort:    Homebound d/t needing assistive device such as cane or FWW and at least one assist for ambulation if leaving the home. Resident is unable to drive secondary to age. Certification for [address]:  Based on the above findings, I certify that this patient meets the criteria for being homebound and has the skilled need for intermittent care as indicated above. The patient is under my care and I have intitated the request for the Plan of Care. A physician and/or non-physician practitioner and collaborating physician will follow this patient and periodically review the POC. Signature:    Electronically Signed By: Anna Torres CNP on 01/22/20 4:28 PM   ______________________________  Anna Torres CNP         Collaborating Physician:  Fercho Horta MD    I have reviewed the patient's medical history in detail and updated the computerized patient record. Prolonged Services: Total time spent 55 minutes. Family education, resident education, completing face to face certification for home health services.   Discussed recommendations for home care with physical therapy and nursing staff.      Paulino Newton, APRN-CNP

## 2020-02-04 PROCEDURE — 86900 BLOOD TYPING SEROLOGIC ABO: CPT

## 2020-02-04 PROCEDURE — 86850 RBC ANTIBODY SCREEN: CPT

## 2020-02-04 PROCEDURE — 86923 COMPATIBILITY TEST ELECTRIC: CPT

## 2020-02-04 PROCEDURE — 36415 COLL VENOUS BLD VENIPUNCTURE: CPT

## 2020-02-04 PROCEDURE — 86901 BLOOD TYPING SEROLOGIC RH(D): CPT

## 2020-02-04 PROCEDURE — P9016 RBC LEUKOCYTES REDUCED: HCPCS

## 2020-02-05 ENCOUNTER — HOSPITAL ENCOUNTER (OUTPATIENT)
Dept: INFUSION THERAPY | Age: 85
Setting detail: INFUSION SERIES
Discharge: HOME | End: 2020-02-05
Payer: MEDICARE

## 2020-02-05 VITALS
HEART RATE: 53 BPM | TEMPERATURE: 97.9 F | RESPIRATION RATE: 18 BRPM | DIASTOLIC BLOOD PRESSURE: 55 MMHG | SYSTOLIC BLOOD PRESSURE: 116 MMHG

## 2020-02-05 DIAGNOSIS — D50.9 IRON DEFICIENCY ANEMIA, UNSPECIFIED IRON DEFICIENCY ANEMIA TYPE: Primary | ICD-10-CM

## 2020-02-05 DIAGNOSIS — D50.8 OTHER IRON DEFICIENCY ANEMIA: ICD-10-CM

## 2020-02-05 LAB
ABO/RH: NORMAL
ANTIBODY SCREEN: NORMAL
BLOOD BANK DISPENSE STATUS: NORMAL
BLOOD BANK PRODUCT CODE: NORMAL
BPU ID: NORMAL
DESCRIPTION BLOOD BANK: NORMAL

## 2020-02-05 PROCEDURE — 36430 TRANSFUSION BLD/BLD COMPNT: CPT

## 2020-02-05 PROCEDURE — 2580000003 HC RX 258

## 2020-02-05 RX ORDER — 0.9 % SODIUM CHLORIDE 0.9 %
250 INTRAVENOUS SOLUTION INTRAVENOUS ONCE
Status: CANCELLED | OUTPATIENT
Start: 2020-02-05

## 2020-02-05 RX ORDER — SODIUM CHLORIDE 9 MG/ML
INJECTION, SOLUTION INTRAVENOUS
Status: COMPLETED
Start: 2020-02-05 | End: 2020-02-05

## 2020-02-05 RX ORDER — 0.9 % SODIUM CHLORIDE 0.9 %
250 INTRAVENOUS SOLUTION INTRAVENOUS ONCE
Status: COMPLETED | OUTPATIENT
Start: 2020-02-05 | End: 2020-02-05

## 2020-02-05 RX ADMIN — SODIUM CHLORIDE 250 ML: 9 INJECTION, SOLUTION INTRAVENOUS at 10:43

## 2020-02-05 RX ADMIN — Medication 250 ML: at 10:43

## 2020-02-05 NOTE — FLOWSHEET NOTE
Lungs sounds have a few scattered rhonchi but clears with cough. Patient has a productive moist occasional cough. Denies any shortness of breath. Heart rate is regular with an occasional skipped beat. No edema noted. Patient is in the chair with his legs elevated. Call light within reach. No distress is noted.

## 2020-02-05 NOTE — FLOWSHEET NOTE
Patient to the floor ambulatory for his blood transfusion. Vital signs taken. Denies any discomfort. Call light within reach. Family at side. Blanco catheter to cd. Yellow urine noted. Consent signed per poa.

## 2020-02-05 NOTE — FLOWSHEET NOTE
Blood transfusion started. Nurse at side for observation. Call light within reach. No distress is noted.

## 2020-02-11 ENCOUNTER — OFFICE VISIT (OUTPATIENT)
Dept: UROLOGY | Age: 85
End: 2020-02-11
Payer: MEDICARE

## 2020-02-11 VITALS
WEIGHT: 135 LBS | SYSTOLIC BLOOD PRESSURE: 118 MMHG | HEART RATE: 62 BPM | OXYGEN SATURATION: 98 % | BODY MASS INDEX: 20.46 KG/M2 | HEIGHT: 68 IN | DIASTOLIC BLOOD PRESSURE: 64 MMHG

## 2020-02-11 PROCEDURE — 99214 OFFICE O/P EST MOD 30 MIN: CPT | Performed by: UROLOGY

## 2020-02-11 PROCEDURE — G8420 CALC BMI NORM PARAMETERS: HCPCS | Performed by: UROLOGY

## 2020-02-11 PROCEDURE — 1036F TOBACCO NON-USER: CPT | Performed by: UROLOGY

## 2020-02-11 PROCEDURE — G8427 DOCREV CUR MEDS BY ELIG CLIN: HCPCS | Performed by: UROLOGY

## 2020-02-11 PROCEDURE — 1123F ACP DISCUSS/DSCN MKR DOCD: CPT | Performed by: UROLOGY

## 2020-02-11 PROCEDURE — 4040F PNEUMOC VAC/ADMIN/RCVD: CPT | Performed by: UROLOGY

## 2020-02-11 PROCEDURE — G8482 FLU IMMUNIZE ORDER/ADMIN: HCPCS | Performed by: UROLOGY

## 2020-02-11 RX ORDER — TAMSULOSIN HYDROCHLORIDE 0.4 MG/1
0.4 CAPSULE ORAL DAILY
Qty: 90 CAPSULE | Refills: 3 | Status: CANCELLED | OUTPATIENT
Start: 2020-02-11

## 2020-02-11 NOTE — PROGRESS NOTES
wheezing  Abdominal: Not distended. No abdominal discomfort  Urologic Exam  Blanco catheter draining clear urine. CT reviewed. .  Musculoskeletal: Patient nonambulatory  Extremities: No edema  Neurological: Cranial nerves intact   Skin: Skin is warm and dry. No lesions. No rashes   Psychiatric: Normal affect. Assessment/Medical Necessity-Decision Making  Urinary retention  60 g prostate  Traumatic Blanco in the hospital  On finasteride and tamsulosin  Plan  Cystoscopy  Trial of void  Greater than 50% of 35 minutes spent consulting patient face-to-face  No orders of the defined types were placed in this encounter. No orders of the defined types were placed in this encounter. Eduardo Dunn MD       Please note this report has been partially produced using speech recognition software  And may cause contain errors related to that system including grammar, punctuation and spelling as well as words and phrases that may seem inappropriate. If there are questions or concerns please feel free to contact me to clarify.

## 2020-02-29 VITALS
TEMPERATURE: 97.7 F | WEIGHT: 149.03 LBS | SYSTOLIC BLOOD PRESSURE: 121 MMHG | HEART RATE: 82 BPM | RESPIRATION RATE: 18 BRPM | OXYGEN SATURATION: 95 % | HEIGHT: 68 IN | DIASTOLIC BLOOD PRESSURE: 65 MMHG | BODY MASS INDEX: 22.59 KG/M2

## 2020-03-01 NOTE — PROGRESS NOTES
PATIENT: Dana Zeng : 1923 DOS: 2020     [facility]    DICTATION ID #:  [id number]. CHIEF COMPLAINT:  Pain in the left toe. 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 He was initially admitted on 2019 and after treatment he was discharged on 2019. During that admission, the patient had various laboratory and imaging studies done with the CT lumbar spine without contrast revealing descending thoracic aortic aneurysm measuring 3.4 cm x 3.6 cm, moderate cardiomegaly, significant vascular calcification involving the BRIDGET, osteopenia, and scattered calcification in the pancreatic tail. Even though his prostate gland was said to be grossly unremarkable from the CT scan of the pelvis that was obtained, his bladder, however, was said to be significantly distended with thickening of the walls to approximately 1 cm. He then had a Blanco catheter placed to drain his urine. Meanwhile, the imaging study also demonstrated fecal retention within the rectum and throughout the colon, which also displayed evidence of diverticulosis; but no evidence of bowel obstruction. There was significant degenerative disc disease of L2 to S1 with moderate facet osteoarthropathy L3 to S1.       The patient was ultimately discharged on 2019 with an indwelling Blanco catheter in place, but upon getting to the nursing home, he was said to have forcefully removed the Blanco catheter x3 and he was taken to the hospital again for treatment on 2020 and another trip to the ER was on 2020 where he was again admitted for dislodged Blanco catheter and was eventually discharged on 01/02/2020 after receiving treatment for dislodged Blanco catheter, acute kidney injury, and hip and low back pain. Today, the patient endorses pain in his left hallux, which he believes prevents him from being able to walk properly. However, due to his memory problem, the patient is unable to tell us the severity of the pain in his left toe. PAST MEDICAL HISTORY:  Squamous cell carcinoma, hypertension, osteopenia, and hypothyroidism. FAMILY HISTORY:  Not contributory. SOCIAL HISTORY:  The patient has two grown sons and denies ever using tobacco products. Last time he had alcohol use was more than 20 years ago. ALLERGIES:  Abilify. MEDICATIONS:  Finasteride 5 mg tablet once daily; levothyroxine 100 mcg once daily; and tamsulosin 0.4 mg capsule once daily. REVIEW OF SYSTEMS:  Unable to review due to the patient's memory impairment. PHYSICAL EXAMINATION:  His vital signs include blood pressure 121/65 mmHg, heart rate is 82 beats per minute, respiratory rate is 18 per minute, temperature 97.7 degrees Fahrenheit, and he is saturating at 95% on room air prior to his discharge from the hospital just three days ago. His height was 5 feet 8 inches and the weight was 149 pounds 0.5 ounces. The patient is a chronically ill appearing, elderly,  male, who is alert and not pale or febrile to the touch. Head is normocephalic with obvious mass involving the left jaw area, completely absent left ear, and obvious droop of the face. He has several missing teeth and neck is supple without palpable organomegaly, lymphadenopathy, or JVD. Lungs are clear to auscultations bilaterally and he displays audible heart tones 1 and 2 with regular rate and rhythm. Abdomen is flat, soft, and non-tender; it has normoactive bowel sounds. He has a Blanco in place draining clear urine, but there is dry blood just at the penile meatus. The patient has knack over talking on the Blanco catheter.   There is

## 2020-03-02 ENCOUNTER — PROCEDURE VISIT (OUTPATIENT)
Dept: UROLOGY | Age: 85
End: 2020-03-02
Payer: MEDICARE

## 2020-03-02 VITALS
BODY MASS INDEX: 20.46 KG/M2 | HEIGHT: 68 IN | DIASTOLIC BLOOD PRESSURE: 80 MMHG | HEART RATE: 80 BPM | SYSTOLIC BLOOD PRESSURE: 120 MMHG | WEIGHT: 135 LBS

## 2020-03-02 PROCEDURE — 52000 CYSTOURETHROSCOPY: CPT | Performed by: UROLOGY

## 2020-04-03 ENCOUNTER — TELEPHONE (OUTPATIENT)
Dept: UROLOGY | Age: 85
End: 2020-04-03

## 2020-04-23 ENCOUNTER — TELEPHONE (OUTPATIENT)
Dept: UROLOGY | Age: 85
End: 2020-04-23

## 2020-04-23 NOTE — TELEPHONE ENCOUNTER
Patient called stating that his catheter is causing him to be in pain. He would like to know what can be done to relief him of some of this pain.

## 2020-05-21 ENCOUNTER — TELEPHONE (OUTPATIENT)
Dept: UROLOGY | Age: 85
End: 2020-05-21

## 2020-07-15 NOTE — TELEPHONE ENCOUNTER
----- Message from Merrill Lyles MD sent at 7/15/2020 10:23 AM EDT -----  Regarding: RE: needs something for bladdeer spasms    Antispasm medicine will make his memory worse   recommend tylenol      ----- Message -----  From: Reginaarmand Peter  Sent: 7/15/2020  10:02 AM EDT  To: Merrill Lyles MD  Subject: needs something for bladdeer spasms              He has catheter. [place] and is having bladder spasms. Can he get medication for that??    [facility] was supposed to contact us but didn't.     Son / Placido Alva # [phone]

## 2020-07-16 NOTE — TELEPHONE ENCOUNTER
Spoke with patients other son/Sky. Told him its best to use Tylenol per Zayda Ryan as the Antispasm meds will make patients memory worse. He understands and will do this in future.

## 2020-07-19 NOTE — ED NOTES
No leakage noted around esposito and positive urine flowing into esposito line. Damion Factor  Anabelle Guard  07/19/20 0544

## 2020-07-19 NOTE — ED NOTES
Pt presents to the Er via [facility] after Stephan Max nurse arrived stating that she was unable to pull out esposito cathetar at home  When patient came, this Rn removed the esposito out with no resistance  Esposito balloon was already deflated       Gama Miranda RN  07/19/20 0421

## 2020-07-19 NOTE — ED PROVIDER NOTES
[facility] [facility] ED  eMERGENCY dEPARTMENT eNCOUnter      Pt Name: Desire Caldwell  MRN: [id number]  Amishagfkathryn 2/22/1923  Date of evaluation: 7/19/2020  Provider: Joe Melissa MD    CHIEF COMPLAINT       Chief Complaint   Patient presents with    Other     [place] [address] nurse states she is unable to pull out blanco and change cathetar         HISTORY OF PRESENT ILLNESS   (Location/Symptom, Timing/Onset,Context/Setting, Quality, Duration, Modifying Factors, Severity)  Note limiting factors. Desire Caldwell is a 80 y.o. male who presents to the emergency department Blanco catheter    80years old presented to the ER for replacement of Blanco catheter as a home health aide nurse states she was unable to get the catheter out to replace it as was scheduled today. The history is provided by the patient. NursingNotes were reviewed. REVIEW OF SYSTEMS    (2-9 systems for level 4, 10 or more for level 5)     Review of Systems   Constitutional: Negative. HENT: Negative. Eyes: Negative. Respiratory: Negative. Cardiovascular: Negative. Gastrointestinal: Negative. Endocrine: Negative. Genitourinary: Positive for difficulty urinating. Musculoskeletal: Negative. Skin: Negative. Allergic/Immunologic: Negative. Neurological: Negative. Psychiatric/Behavioral: Negative. Except as noted above the remainder of the review of systems was reviewed and negative. PAST MEDICAL HISTORY     Past Medical History:   Diagnosis Date    Cancer [facility])     skin CA left ear    Hypertension          SURGICALHISTORY     No past surgical history on file.       CURRENT MEDICATIONS       Discharge Medication List as of 7/19/2020 11:26 AM      CONTINUE these medications which have NOT CHANGED    Details   finasteride (PROSCAR) 5 MG tablet Take 1 tablet by mouth daily, Disp-30 tablet, R-3NO PRINT      tamsulosin (FLOMAX) 0.4 MG capsule Take 1 capsule by mouth daily, Disp-30 capsule, R-3NO PRINT levothyroxine (SYNTHROID) 100 MCG tablet Take 100 mcg by mouth DailyHistorical Med             ALLERGIES     Patient has no known allergies. FAMILY HISTORY     No family history on file. SOCIAL HISTORY       Social History     Socioeconomic History    Marital status:      Spouse name: Not on file    Number of children: Not on file    Years of education: Not on file    Highest education level: Not on file   Occupational History    Not on file   Social Needs    Financial resource strain: Not on file    Food insecurity     Worry: Not on file     Inability: Not on file    Transportation needs     Medical: Not on file     Non-medical: Not on file   Tobacco Use    Smoking status: Never Smoker    Smokeless tobacco: Never Used   Substance and Sexual Activity    Alcohol use: Not Currently    Drug use: Never    Sexual activity: Never   Lifestyle    Physical activity     Days per week: Not on file     Minutes per session: Not on file    Stress: Not on file   Relationships    Social connections     Talks on phone: Not on file     Gets together: Not on file     Attends [employer] service: Not on file     Active member of club or organization: Not on file     Attends meetings of clubs or organizations: Not on file     Relationship status: Not on file    Intimate partner violence     Fear of current or ex partner: Not on file     Emotionally abused: Not on file     Physically abused: Not on file     Forced sexual activity: Not on file   Other Topics Concern    Not on file   Social History Narrative    Not on file       SCREENINGS      @[facility]([id number])@      PHYSICAL EXAM    (up to 7 for level 4, 8 or more for level 5)     ED Triage Vitals [07/19/20 1049]   BP Temp Temp Source Pulse Resp SpO2 Height Weight   (!) 142/83 97.8 °F (36.6 °C) Oral 68 16 96 % 6' (1.829 m) 150 lb (68 kg)       Physical Exam  Vitals signs and nursing note reviewed. Constitutional:       Appearance: He is well-developed. HENT:      Head: Normocephalic and atraumatic. Right Ear: External ear normal.      Left Ear: External ear normal.      Nose: Nose normal.   Eyes:      Pupils: Pupils are equal, round, and reactive to light. Neck:      Musculoskeletal: Normal range of motion and neck supple. Cardiovascular:      Rate and Rhythm: Normal rate and regular rhythm. Heart sounds: Normal heart sounds. Pulmonary:      Effort: Pulmonary effort is normal. No respiratory distress. Breath sounds: Normal breath sounds. No wheezing or rales. Chest:      Chest wall: No tenderness. Abdominal:      General: Bowel sounds are normal.      Palpations: Abdomen is soft. Musculoskeletal: Normal range of motion. Skin:     General: Skin is warm and dry. Neurological:      Mental Status: He is alert and oriented to person, place, and time. Cranial Nerves: No cranial nerve deficit. Sensory: No sensory deficit. Motor: No abnormal muscle tone. Coordination: Coordination normal.      Deep Tendon Reflexes: Reflexes normal.   Psychiatric:         Behavior: Behavior normal.         Thought Content: Thought content normal.         Judgment: Judgment normal.         DIAGNOSTIC RESULTS     EKG: All EKG's are interpreted by the Emergency Department Physician who either signs or Co-signsthis chart in the absence of a cardiologist.        RADIOLOGY:   [place] such as CT, Ultrasound and MRI are read by the radiologist. Plain radiographic images are visualized and preliminarily interpreted by the emergency physician with the below findings:        Interpretation per the Radiologist below, if available at the time ofthis note:    No orders to display         ED BEDSIDE ULTRASOUND:   Performed by ED Physician - none    LABS:  Labs Reviewed - No data to display    All other labs were within normal range or not returned as of this dictation.     EMERGENCY DEPARTMENT COURSE and DIFFERENTIAL DIAGNOSIS/MDM: Vitals:    Vitals:    07/19/20 1049   BP: (!) 142/83   Pulse: 68   Resp: 16   Temp: 97.8 °F (36.6 °C)   TempSrc: Oral   SpO2: 96%   Weight: 150 lb (68 kg)   Height: 6' (1.829 m)              MDM  Number of Diagnoses or Management Options  Encounter for Blanco catheter replacement:   Diagnosis management comments: 80years old presented to the ER to replace Blanco. Initially the home health care nurse have hard time pulling the Blanco but on arrival to the ER the Blanco came out spontaneously Dr. Tanya Wong was also in the ER replace the Blanco and advised the patient to continue Cipro and follow-up with Dr. Gardenia Manuel  patient was discharged back to home       Amount and/or Complexity of Data Reviewed  Tests in the radiology section of CPT®: ordered      CONSULTS:  None    PROCEDURES:  Unless otherwise noted below, none     Procedures    FINAL IMPRESSION      1.  Encounter for Blanco catheter replacement          DISPOSITION/PLAN   DISPOSITION Decision To Discharge 07/19/2020 11:25:52 AM      PATIENT REFERRED TO:  Faith Fox DO  [address] [place] [address]  [address]  [facility] [id number]  [phone]    In 2 days        DISCHARGE MEDICATIONS:  Discharge Medication List as of 7/19/2020 11:26 AM             (Please note thatportions of this note were completed with a voice recognition program.  Efforts were made to edit the dictations but occasionally words are mis-transcribed.)    Fernandez Velasquez MD (electronically signed)  Attending Emergency Physician          Faith Choi MD  07/19/20 4497

## 2020-07-19 NOTE — ED NOTES
Bed: 20  Expected date:   Expected time:   Means of arrival:   Comments:  89M had catheter replaced this morning; now leaking     Bayron Murray RN  07/19/20 6297

## 2020-07-19 NOTE — ED NOTES
Bed: 20  Expected date:   Expected time:   Means of arrival:   Comments:  89M home health nurse unable to get esposito out 130/70 68 18 98 RA      Lenin Cooney RN  07/19/20 1047

## 2020-07-19 NOTE — ED PROVIDER NOTES
[address] [facility] ED  eMERGENCY dEPARTMENT eNCOUnter      Pt Name: China Braxton  MRN: [id number]  [place] 2/22/1923  Date of evaluation: 7/19/2020  Provider: Jh Huitron MD    CHIEF COMPLAINT       Chief Complaint   Patient presents with    Urinary Catheter Problem     Pt's catheter continues to leak after it being replaced by Dr Lester Nick earlier today         HISTORY OF PRESENT ILLNESS   (Location/Symptom, Timing/Onset,Context/Setting, Quality, Duration, Modifying Factors, Severity)  Note limiting factors. China Braxton is a 80 y.o. male who presents to the emergency department   Blanco problem       80years old with indwelling Blanco's catheter was sent again to the ER today for some leaking around the catheter  No other concerns    The history is provided by the patient and a relative. NursingNotes were reviewed. REVIEW OF SYSTEMS    (2-9 systems for level 4, 10 or more for level 5)     Review of Systems    Except as noted above the remainder of the review of systems was reviewed and negative. PAST MEDICAL HISTORY     Past Medical History:   Diagnosis Date    Cancer [facility])     skin CA left ear    Hypertension          SURGICALHISTORY     History reviewed. No pertinent surgical history. CURRENT MEDICATIONS       Discharge Medication List as of 7/19/2020  6:03 PM      CONTINUE these medications which have NOT CHANGED    Details   finasteride (PROSCAR) 5 MG tablet Take 1 tablet by mouth daily, Disp-30 tablet, R-3NO PRINT      tamsulosin (FLOMAX) 0.4 MG capsule Take 1 capsule by mouth daily, Disp-30 capsule, R-3NO PRINT      levothyroxine (SYNTHROID) 100 MCG tablet Take 100 mcg by mouth DailyHistorical Med             ALLERGIES     Patient has no known allergies. FAMILY HISTORY     History reviewed. No pertinent family history. SOCIAL HISTORY       Social History     Socioeconomic History    Marital status:       Spouse name: None    Number of children: None    Years of education: None    Highest education level: None   Occupational History    None   Social Needs    Financial resource strain: None    Food insecurity     Worry: None     Inability: None    Transportation needs     Medical: None     Non-medical: None   Tobacco Use    Smoking status: Never Smoker    Smokeless tobacco: Never Used   Substance and Sexual Activity    Alcohol use: Not Currently    Drug use: Never    Sexual activity: Never   Lifestyle    Physical activity     Days per week: None     Minutes per session: None    Stress: None   Relationships    Social connections     Talks on phone: None     Gets together: None     Attends Jew service: None     Active member of club or organization: None     Attends meetings of clubs or organizations: None     Relationship status: None    Intimate partner violence     Fear of current or ex partner: None     Emotionally abused: None     Physically abused: None     Forced sexual activity: None   Other Topics Concern    None   Social History Narrative    None       SCREENINGS      @FLOW([id number])@      PHYSICAL EXAM    (up to 7 for level 4, 8 or more for level 5)     ED Triage Vitals [07/19/20 1652]   BP Temp Temp Source Pulse Resp SpO2 Height Weight   127/76 97.9 °F (36.6 °C) Oral 59 18 97 % 6' (1.829 m) 150 lb (68 kg)       Physical Exam  Vitals signs and nursing note reviewed. Constitutional:       Appearance: He is well-developed. HENT:      Head: Normocephalic and atraumatic. Right Ear: External ear normal.      Left Ear: External ear normal.      Nose: Nose normal.   Eyes:      Pupils: Pupils are equal, round, and reactive to light. Neck:      Musculoskeletal: Normal range of motion and neck supple. Cardiovascular:      Rate and Rhythm: Normal rate and regular rhythm. Heart sounds: Normal heart sounds. Pulmonary:      Effort: Pulmonary effort is normal. No respiratory distress. Breath sounds: Normal breath sounds.  No stridor. No wheezing or rales. Chest:      Chest wall: No tenderness. Abdominal:      General: Bowel sounds are normal.      Palpations: Abdomen is soft. Musculoskeletal: Normal range of motion. Skin:     General: Skin is warm and dry. Neurological:      Mental Status: He is alert and oriented to person, place, and time. Cranial Nerves: No cranial nerve deficit. Sensory: No sensory deficit. Motor: No abnormal muscle tone. Coordination: Coordination normal.      Deep Tendon Reflexes: Reflexes normal.   Psychiatric:         Behavior: Behavior normal.         Thought Content: Thought content normal.         Judgment: Judgment normal.         DIAGNOSTIC RESULTS     EKG: All EKG's are interpreted by the Emergency Department Physician who either signs or Co-signsthis chart in the absence of a cardiologist.         RADIOLOGY:   Dolph Duel such as CT, Ultrasound and MRI are read by the radiologist. Plain radiographic images are visualized and preliminarily interpreted by the emergency physician with the below findings:       Interpretation per the Radiologist below, if available at the time ofthis note:    No orders to display         ED BEDSIDE ULTRASOUND:   Performed by ED Physician - none    LABS:  Labs Reviewed - No data to display    All other labs were within normal range or not returned as of this dictation.     EMERGENCY DEPARTMENT COURSE and DIFFERENTIAL DIAGNOSIS/MDM:   Vitals:    Vitals:    07/19/20 1652 07/19/20 1700 07/19/20 1730   BP: 127/76 119/61 128/66   Pulse: 59  55   Resp: 18     Temp: 97.9 °F (36.6 °C)     TempSrc: Oral     SpO2: 97%  98%   Weight: 150 lb (68 kg)     Height: 6' (1.829 m)                   MDM  Number of Diagnoses or Management Options  Urinary catheter insertion/adjustment/removal:   Diagnosis management comments: 80years old who was here earlier for Blanco replacement presented again with a concern from his son that there is a lot of leaking around the Blanco catheter. Blanco catheter was examined balloon was not inflated so was inflated with more saline after which the Blanco start functioning correctly and there was no more leaking noted in the ER. Min released to follow-up with Dr. Dillon Cedeño as scheduled          CONSULTS:  None    PROCEDURES:  Unless otherwise noted below, none     Procedures    FINAL IMPRESSION      1. Urinary catheter insertion/adjustment/removal          DISPOSITION/PLAN   DISPOSITION Decision To Discharge 07/19/2020 05:12:15 PM      PATIENT REFERRED TO:  No follow-up provider specified.     DISCHARGE MEDICATIONS:  Discharge Medication List as of 7/19/2020  6:03 PM             (Please note thatportions of this note were completed with a voice recognition program.  Efforts were made to edit the dictations but occasionally words are mis-transcribed.)    Merlyn Reynoso MD (electronically signed)  Attending Emergency Physician        Ronald Choi MD  07/19/20 2239

## 2020-07-19 NOTE — CONSULTS
Renetta Kwan [address]                      [address], [address] [facility]                                  CONSULTATION    PATIENT NAME: Jose Antonio Burton                    :        1923  MED REC NO:   [id number]                            ROOM:       20  ACCOUNT NO:   [de-identified]                           ADMIT DATE: 2020  PROVIDER:     Janes Cancino MD    CONSULT DATE:  2020    ER CONSULTATION    REASON FOR CONSULTATION:  Inability to remove Blanco catheter, chronic  urinary retention. HISTORY OF PRESENT ILLNESS:  This is a 72-year-old male established  patient of Dr. Stacy Hui, who was found to be in urinary retention at  initial consultation on 2020. He had actually pulled his Blanco  catheter at that time with the balloon inflated while an inpatient and  it was replaced cystoscopically over a wire. At that time, a 22-Arabic  [place]-tip catheter was placed. He was eventually seen by Dr. Stacy Hui. He recommended a chronic  catheter, as he had a cystoscopy by Dr. Stacy Hui in March, which showed  severely obstructive prostatic enlargement with a neurogenic type  bladder and was felt to have a neurogenic bladder based on this and the  catheters have been changed by [facility]. Over the last week or two, the patient has been having some spasms and  felt to possibly have a UTI and was started on Cipro by home health. The home health nurse called this morning reporting that yesterday she  was out because the patient was complaining of worsened spasms and  irritation from the Blanco and leakage was noted. She flushed the  catheter. She was able to infuse, but not remove the urine; then this  morning attempted a catheter change, was unable to remove the previously  placed catheter.   I recommended he be sent to the emergency room and  upon my entering the emergency room bay according to the nurse and the  physician Dr. Melly Ren, the previously placed Blanco catheter had fallen  out. A 2% lidocaine Uro-Jet had been placed in preparation for Blanco  catheter re-insertion. I recommended a 16-[place] Coude-tip catheter be  placed. The patient is a poor historian due to significant hearing loss. His  son was in the room as well. They denied hematuria, fevers. He is,  however, having bladder spasms related to the Blanco lately. He has no  other current urologic complaints. PAST MEDICAL HISTORY:  Include skin cancer, hypertension. PAST SURGICAL HISTORY:  None. SOCIAL HISTORY:  He never smoked. CURRENT MEDICATIONS:  Include Proscar, Flomax, Synthroid. ALLERGIES:  He has no known drug allergies. REVIEW OF SYSTEMS:  He denies chest pain, shortness of breath, diarrhea,  otherwise negative and noncontributory. PHYSICAL EXAMINATION:  GENERAL:  He was a frail-appearing white male in no obvious acute  distress. VITAL SIGNS:  Blood pressure 130/70,respiratory rate  18. HEENT:  Atraumatic, normocephalic. ABDOMEN:  Soft and nondistended. He had no CVA tenderness. GENITOURINARY:  Revealed a normal male phallus with no evidence for any  urethral bleeding. His testes were downgoing. His scrotum was  unremarkable. EXTREMITIES:  Showed no cyanosis. NEUROLOGIC:  He was alert. PSYCHIATRIC:  He had a flat affect. PROCEDURE:  Under sterile conditions with a previously placed 2%  lidocaine Uro-Jet, a 16-S[place] T[place]an Coude-tip catheter was guided  atraumatically through the urethra into the bladder with some resistance  at the prostatic urethra due to the size of the prostate making this  catheter insertion more complex. The bladder was drained  of a large residual of clear yellow urine, 10 mL of sterile water placed  in the balloon. The catheter placed at Blanco leg bag. IMPRESSION:  A 80-year-old male with history of BPH and neurogenic-type  bladder managed with a chronic indwelling Blanco changed monthly by home  health care.   It is likely that the catheter become dislodged at some  point within the last few days given the difficulties that the patient  and home health care have had with the catheter. The catheter has been  successfully changed and previous catheter was removed and changed with  a 16-Togolese Coude catheter today. The patient can be discharged home  from a urologic standpoint. He will continue on the ciprofloxacin he  was prescribed over the weekend. I recommended they call Dr. Amy Jenkins  to alert him of these issues to see if any further followup is  necessary. We will continue to follow him.         Aaliyah Waller MD    D: 07/19/2020 11:41:39       T: 07/19/2020 12:34:58     [email or web address]  Job#: [id number]     Doc#: [id number]    CC:  Jami Barrios MD

## 2020-07-19 NOTE — ED NOTES
Pt cleaned of stool and pt dressed in clothes brought by family. warm blankets given     Tamika Chanel.  Padmini Langley  07/19/20 1146

## 2020-07-19 NOTE — ED NOTES
Dr Audra Aguila at bedside for reinsertion of catheter. Placed 16 fr coudet after urojet insertion. pt yelling \"my penis,my penis\" esposito insert w/o difficulty. Imagene Neo Mcqueen  07/19/20 1124

## 2020-07-20 NOTE — TELEPHONE ENCOUNTER
Son wants to know if [facility] can change the patient's catheter every 3 weeks instead of monthly because of problems with it.

## 2021-01-01 ENCOUNTER — HOSPITAL ENCOUNTER (INPATIENT)
Age: 86
LOS: 6 days | Discharge: NURSING, REHAB OR LONG-TERM CARE | DRG: 698 | End: 2021-06-18
Attending: INTERNAL MEDICINE | Admitting: INTERNAL MEDICINE
Payer: MEDICARE

## 2021-01-01 ENCOUNTER — TELEPHONE (OUTPATIENT)
Dept: UROLOGY | Age: 86
End: 2021-01-01

## 2021-01-01 ENCOUNTER — HOSPITAL ENCOUNTER (INPATIENT)
Age: 86
LOS: 3 days | Discharge: HOSPICE | DRG: 872 | End: 2021-06-21
Attending: INTERNAL MEDICINE | Admitting: INTERNAL MEDICINE
Payer: MEDICARE

## 2021-01-01 ENCOUNTER — APPOINTMENT (OUTPATIENT)
Dept: CT IMAGING | Age: 86
DRG: 698 | End: 2021-01-01
Payer: MEDICARE

## 2021-01-01 ENCOUNTER — HOSPITAL ENCOUNTER (OUTPATIENT)
Age: 86
Setting detail: OBSERVATION
Discharge: HOME HEALTH | End: 2021-02-17
Attending: INTERNAL MEDICINE | Admitting: INTERNAL MEDICINE
Payer: MEDICARE

## 2021-01-01 ENCOUNTER — APPOINTMENT (OUTPATIENT)
Dept: GENERAL RADIOLOGY | Age: 86
DRG: 698 | End: 2021-01-01
Payer: MEDICARE

## 2021-01-01 VITALS
TEMPERATURE: 98.6 F | BODY MASS INDEX: 22.52 KG/M2 | HEART RATE: 66 BPM | DIASTOLIC BLOOD PRESSURE: 85 MMHG | OXYGEN SATURATION: 95 % | SYSTOLIC BLOOD PRESSURE: 144 MMHG | HEIGHT: 66 IN | WEIGHT: 140.1 LBS | RESPIRATION RATE: 18 BRPM

## 2021-01-01 VITALS
SYSTOLIC BLOOD PRESSURE: 139 MMHG | BODY MASS INDEX: 22.34 KG/M2 | HEART RATE: 73 BPM | WEIGHT: 139 LBS | HEIGHT: 66 IN | TEMPERATURE: 97.3 F | RESPIRATION RATE: 18 BRPM | OXYGEN SATURATION: 94 % | DIASTOLIC BLOOD PRESSURE: 78 MMHG

## 2021-01-01 VITALS
SYSTOLIC BLOOD PRESSURE: 137 MMHG | WEIGHT: 140 LBS | HEIGHT: 66 IN | DIASTOLIC BLOOD PRESSURE: 72 MMHG | OXYGEN SATURATION: 99 % | RESPIRATION RATE: 18 BRPM | TEMPERATURE: 97.5 F | BODY MASS INDEX: 22.5 KG/M2 | HEART RATE: 59 BPM

## 2021-01-01 DIAGNOSIS — N39.0 URINARY TRACT INFECTION WITHOUT HEMATURIA, SITE UNSPECIFIED: Primary | ICD-10-CM

## 2021-01-01 DIAGNOSIS — A41.9 SEPTICEMIA (HCC): Primary | ICD-10-CM

## 2021-01-01 DIAGNOSIS — R77.8 ELEVATED TROPONIN: ICD-10-CM

## 2021-01-01 DIAGNOSIS — R79.89 ELEVATED LACTIC ACID LEVEL: ICD-10-CM

## 2021-01-01 DIAGNOSIS — T83.511A URINARY TRACT INFECTION ASSOCIATED WITH INDWELLING URETHRAL CATHETER, INITIAL ENCOUNTER (HCC): ICD-10-CM

## 2021-01-01 DIAGNOSIS — N39.0 URINARY TRACT INFECTION ASSOCIATED WITH INDWELLING URETHRAL CATHETER, INITIAL ENCOUNTER (HCC): ICD-10-CM

## 2021-01-01 DIAGNOSIS — R41.0 CONFUSION: ICD-10-CM

## 2021-01-01 LAB
ALBUMIN SERPL-MCNC: 2.8 G/DL (ref 3.5–4.6)
ALBUMIN SERPL-MCNC: 3 G/DL (ref 3.75–5.01)
ALBUMIN SERPL-MCNC: 3.5 G/DL (ref 3.5–4.6)
ALBUMIN SERPL-MCNC: 3.5 G/DL (ref 3.5–4.6)
ALBUMIN SERPL-MCNC: 3.7 G/DL (ref 3.5–4.6)
ALP BLD-CCNC: 106 U/L (ref 35–104)
ALP BLD-CCNC: 107 U/L (ref 35–104)
ALP BLD-CCNC: 108 U/L (ref 35–104)
ALP BLD-CCNC: 74 U/L (ref 35–104)
ALPHA-1-GLOBULIN: 0.39 G/DL (ref 0.19–0.46)
ALPHA-2-GLOBULIN: 0.62 G/DL (ref 0.48–1.05)
ALT SERPL-CCNC: 15 U/L (ref 0–41)
ALT SERPL-CCNC: 16 U/L (ref 0–41)
ALT SERPL-CCNC: 7 U/L (ref 0–41)
ALT SERPL-CCNC: 9 U/L (ref 0–41)
AMORPHOUS: ABNORMAL
ANION GAP SERPL CALCULATED.3IONS-SCNC: 10 MEQ/L (ref 9–15)
ANION GAP SERPL CALCULATED.3IONS-SCNC: 11 MEQ/L (ref 9–15)
ANION GAP SERPL CALCULATED.3IONS-SCNC: 12 MEQ/L (ref 9–15)
ANION GAP SERPL CALCULATED.3IONS-SCNC: 12 MEQ/L (ref 9–15)
ANION GAP SERPL CALCULATED.3IONS-SCNC: 13 MEQ/L (ref 9–15)
APTT: 38.3 SEC (ref 24.4–36.8)
AST SERPL-CCNC: 13 U/L (ref 0–40)
AST SERPL-CCNC: 19 U/L (ref 0–40)
AST SERPL-CCNC: 22 U/L (ref 0–40)
AST SERPL-CCNC: 29 U/L (ref 0–40)
BACTERIA: ABNORMAL /HPF
BACTERIA: ABNORMAL /HPF
BASOPHILS ABSOLUTE: 0 K/UL (ref 0–0.2)
BASOPHILS RELATIVE PERCENT: 0.4 %
BASOPHILS RELATIVE PERCENT: 0.5 %
BASOPHILS RELATIVE PERCENT: 0.7 %
BASOPHILS RELATIVE PERCENT: 0.7 %
BETA GLOBULIN: 0.94 G/DL (ref 0.48–1.1)
BILIRUB SERPL-MCNC: 0.4 MG/DL (ref 0.2–0.7)
BILIRUB SERPL-MCNC: 0.5 MG/DL (ref 0.2–0.7)
BILIRUB SERPL-MCNC: 0.6 MG/DL (ref 0.2–0.7)
BILIRUB SERPL-MCNC: 0.8 MG/DL (ref 0.2–0.7)
BILIRUBIN URINE: NEGATIVE
BILIRUBIN URINE: NEGATIVE
BLOOD CULTURE, ROUTINE: NORMAL
BLOOD, URINE: ABNORMAL
BLOOD, URINE: ABNORMAL
BUN BLDV-MCNC: 19 MG/DL (ref 8–23)
BUN BLDV-MCNC: 24 MG/DL (ref 8–23)
BUN BLDV-MCNC: 29 MG/DL (ref 8–23)
BUN BLDV-MCNC: 31 MG/DL (ref 8–23)
BUN BLDV-MCNC: 37 MG/DL (ref 8–23)
CALCIUM SERPL-MCNC: 8.2 MG/DL (ref 8.5–9.9)
CALCIUM SERPL-MCNC: 8.8 MG/DL (ref 8.5–9.9)
CALCIUM SERPL-MCNC: 9.1 MG/DL (ref 8.5–9.9)
CALCIUM SERPL-MCNC: 9.3 MG/DL (ref 8.5–9.9)
CALCIUM SERPL-MCNC: 9.3 MG/DL (ref 8.5–9.9)
CHLORIDE BLD-SCNC: 103 MEQ/L (ref 95–107)
CHLORIDE BLD-SCNC: 103 MEQ/L (ref 95–107)
CHLORIDE BLD-SCNC: 95 MEQ/L (ref 95–107)
CHLORIDE BLD-SCNC: 98 MEQ/L (ref 95–107)
CHLORIDE BLD-SCNC: 99 MEQ/L (ref 95–107)
CLARITY: ABNORMAL
CLARITY: ABNORMAL
CO2: 21 MEQ/L (ref 20–31)
CO2: 22 MEQ/L (ref 20–31)
CO2: 24 MEQ/L (ref 20–31)
COLOR: YELLOW
COLOR: YELLOW
CREAT SERPL-MCNC: 0.67 MG/DL (ref 0.7–1.2)
CREAT SERPL-MCNC: 0.83 MG/DL (ref 0.7–1.2)
CREAT SERPL-MCNC: 0.85 MG/DL (ref 0.7–1.2)
CREAT SERPL-MCNC: 0.95 MG/DL (ref 0.7–1.2)
CREAT SERPL-MCNC: 1.02 MG/DL (ref 0.7–1.2)
CRYSTALS, UA: ABNORMAL /HPF
CULTURE, BLOOD 2: NORMAL
EKG ATRIAL RATE: 250 BPM
EKG ATRIAL RATE: 91 BPM
EKG P AXIS: 7 DEGREES
EKG P-R INTERVAL: 206 MS
EKG Q-T INTERVAL: 364 MS
EKG Q-T INTERVAL: 456 MS
EKG QRS DURATION: 102 MS
EKG QRS DURATION: 96 MS
EKG QTC CALCULATION (BAZETT): 447 MS
EKG QTC CALCULATION (BAZETT): 451 MS
EKG R AXIS: -29 DEGREES
EKG R AXIS: -33 DEGREES
EKG T AXIS: 32 DEGREES
EKG T AXIS: 77 DEGREES
EKG VENTRICULAR RATE: 59 BPM
EKG VENTRICULAR RATE: 91 BPM
EOSINOPHILS ABSOLUTE: 0 K/UL (ref 0–0.7)
EOSINOPHILS ABSOLUTE: 0 K/UL (ref 0–0.7)
EOSINOPHILS ABSOLUTE: 0.1 K/UL (ref 0–0.7)
EOSINOPHILS ABSOLUTE: 0.1 K/UL (ref 0–0.7)
EOSINOPHILS RELATIVE PERCENT: 0.3 %
EOSINOPHILS RELATIVE PERCENT: 0.8 %
EOSINOPHILS RELATIVE PERCENT: 0.8 %
EOSINOPHILS RELATIVE PERCENT: 0.9 %
EPITHELIAL CELLS, UA: ABNORMAL /HPF (ref 0–5)
EPITHELIAL CELLS, UA: ABNORMAL /HPF (ref 0–5)
GAMMA GLOBULIN: 1.55 G/DL (ref 0.62–1.51)
GFR AFRICAN AMERICAN: >60
GFR NON-AFRICAN AMERICAN: >60
GLOBULIN: 3.8 G/DL (ref 2.3–3.5)
GLOBULIN: 3.9 G/DL (ref 2.3–3.5)
GLUCOSE BLD-MCNC: 119 MG/DL (ref 70–99)
GLUCOSE BLD-MCNC: 132 MG/DL (ref 70–99)
GLUCOSE BLD-MCNC: 145 MG/DL (ref 70–99)
GLUCOSE BLD-MCNC: 89 MG/DL (ref 70–99)
GLUCOSE BLD-MCNC: 98 MG/DL (ref 70–99)
GLUCOSE URINE: NEGATIVE MG/DL
GLUCOSE URINE: NEGATIVE MG/DL
HCT VFR BLD CALC: 23.7 % (ref 42–52)
HCT VFR BLD CALC: 26.6 % (ref 42–52)
HCT VFR BLD CALC: 26.8 % (ref 42–52)
HCT VFR BLD CALC: 27 % (ref 42–52)
HEMOGLOBIN: 8.2 G/DL (ref 14–18)
HEMOGLOBIN: 9.2 G/DL (ref 14–18)
HEMOGLOBIN: 9.5 G/DL (ref 14–18)
HEMOGLOBIN: 9.5 G/DL (ref 14–18)
HYALINE CASTS: ABNORMAL /LPF (ref 0–5)
IMMUNOFIXATION REFLEX: ABNORMAL
INR BLD: 1.2
KETONES, URINE: ABNORMAL MG/DL
KETONES, URINE: NEGATIVE MG/DL
LACTIC ACID, SEPSIS: 2.6 MMOL/L (ref 0.5–1.9)
LACTIC ACID: 0.8 MMOL/L (ref 0.5–2.2)
LACTIC ACID: 2.3 MMOL/L (ref 0.5–2.2)
LEUKOCYTE ESTERASE, URINE: ABNORMAL
LEUKOCYTE ESTERASE, URINE: ABNORMAL
LIPASE: 14 U/L (ref 12–95)
LYMPHOCYTES ABSOLUTE: 0.9 K/UL (ref 1–4.8)
LYMPHOCYTES ABSOLUTE: 1 K/UL (ref 1–4.8)
LYMPHOCYTES ABSOLUTE: 1.1 K/UL (ref 1–4.8)
LYMPHOCYTES ABSOLUTE: 1.5 K/UL (ref 1–4.8)
LYMPHOCYTES RELATIVE PERCENT: 17.9 %
LYMPHOCYTES RELATIVE PERCENT: 18.1 %
LYMPHOCYTES RELATIVE PERCENT: 19.7 %
LYMPHOCYTES RELATIVE PERCENT: 7.9 %
MAGNESIUM: 1.8 MG/DL (ref 1.7–2.4)
MCH RBC QN AUTO: 34.7 PG (ref 27–31.3)
MCH RBC QN AUTO: 35 PG (ref 27–31.3)
MCH RBC QN AUTO: 35.3 PG (ref 27–31.3)
MCH RBC QN AUTO: 35.5 PG (ref 27–31.3)
MCHC RBC AUTO-ENTMCNC: 34.4 % (ref 33–37)
MCHC RBC AUTO-ENTMCNC: 34.7 % (ref 33–37)
MCHC RBC AUTO-ENTMCNC: 35.2 % (ref 33–37)
MCHC RBC AUTO-ENTMCNC: 35.5 % (ref 33–37)
MCV RBC AUTO: 100 FL (ref 80–100)
MCV RBC AUTO: 100.2 FL (ref 80–100)
MCV RBC AUTO: 100.9 FL (ref 80–100)
MCV RBC AUTO: 100.9 FL (ref 80–100)
MONOCYTES ABSOLUTE: 0.5 K/UL (ref 0.2–0.8)
MONOCYTES ABSOLUTE: 0.5 K/UL (ref 0.2–0.8)
MONOCYTES ABSOLUTE: 0.6 K/UL (ref 0.2–0.8)
MONOCYTES ABSOLUTE: 0.8 K/UL (ref 0.2–0.8)
MONOCYTES RELATIVE PERCENT: 10.8 %
MONOCYTES RELATIVE PERCENT: 3.9 %
MONOCYTES RELATIVE PERCENT: 8.6 %
MONOCYTES RELATIVE PERCENT: 9.8 %
NEUTROPHILS ABSOLUTE: 10.2 K/UL (ref 1.4–6.5)
NEUTROPHILS ABSOLUTE: 3.9 K/UL (ref 1.4–6.5)
NEUTROPHILS ABSOLUTE: 4.5 K/UL (ref 1.4–6.5)
NEUTROPHILS ABSOLUTE: 5.2 K/UL (ref 1.4–6.5)
NEUTROPHILS RELATIVE PERCENT: 68.2 %
NEUTROPHILS RELATIVE PERCENT: 70.7 %
NEUTROPHILS RELATIVE PERCENT: 71.8 %
NEUTROPHILS RELATIVE PERCENT: 87.5 %
NITRITE, URINE: NEGATIVE
NITRITE, URINE: POSITIVE
ORGANISM: ABNORMAL
PDW BLD-RTO: 14.2 % (ref 11.5–14.5)
PDW BLD-RTO: 14.4 % (ref 11.5–14.5)
PDW BLD-RTO: 14.7 % (ref 11.5–14.5)
PDW BLD-RTO: 14.7 % (ref 11.5–14.5)
PH UA: 7.5 (ref 5–9)
PH UA: 7.5 (ref 5–9)
PLATELET # BLD: 185 K/UL (ref 130–400)
PLATELET # BLD: 217 K/UL (ref 130–400)
PLATELET # BLD: 219 K/UL (ref 130–400)
PLATELET # BLD: 226 K/UL (ref 130–400)
POTASSIUM SERPL-SCNC: 4.1 MEQ/L (ref 3.4–4.9)
POTASSIUM SERPL-SCNC: 4.2 MEQ/L (ref 3.4–4.9)
POTASSIUM SERPL-SCNC: 4.3 MEQ/L (ref 3.4–4.9)
POTASSIUM SERPL-SCNC: 4.4 MEQ/L (ref 3.4–4.9)
POTASSIUM SERPL-SCNC: 4.5 MEQ/L (ref 3.4–4.9)
PRO-BNP: 931 PG/ML
PROCALCITONIN: 0.11 NG/ML (ref 0–0.15)
PROCALCITONIN: 0.11 NG/ML (ref 0–0.15)
PROCALCITONIN: 0.8 NG/ML (ref 0–0.15)
PROTEIN UA: >=300 MG/DL
PROTEIN UA: >=300 MG/DL
PROTHROMBIN TIME: 15.6 SEC (ref 12.3–14.9)
RBC # BLD: 2.34 M/UL (ref 4.7–6.1)
RBC # BLD: 2.66 M/UL (ref 4.7–6.1)
RBC # BLD: 2.66 M/UL (ref 4.7–6.1)
RBC # BLD: 2.69 M/UL (ref 4.7–6.1)
RBC UA: ABNORMAL /HPF (ref 0–2)
RBC UA: ABNORMAL /HPF (ref 0–2)
SARS-COV-2, NAAT: NOT DETECTED
SODIUM BLD-SCNC: 129 MEQ/L (ref 135–144)
SODIUM BLD-SCNC: 134 MEQ/L (ref 135–144)
SODIUM BLD-SCNC: 135 MEQ/L (ref 135–144)
SODIUM BLD-SCNC: 136 MEQ/L (ref 135–144)
SODIUM BLD-SCNC: 137 MEQ/L (ref 135–144)
SPE/IFE INTERPRETATION: ABNORMAL
SPECIFIC GRAVITY UA: 1.02 (ref 1–1.03)
SPECIFIC GRAVITY UA: 1.02 (ref 1–1.03)
TOTAL CK: 61 U/L (ref 0–190)
TOTAL PROTEIN: 6.5 G/DL (ref 6.3–8.2)
TOTAL PROTEIN: 6.6 G/DL (ref 6.3–8)
TOTAL PROTEIN: 7.3 G/DL (ref 6.3–8)
TOTAL PROTEIN: 7.4 G/DL (ref 6.3–8)
TOTAL PROTEIN: 7.5 G/DL (ref 6.3–8)
TROPONIN: 0.06 NG/ML (ref 0–0.01)
TSH SERPL DL<=0.05 MIU/L-ACNC: 0.84 UIU/ML (ref 0.44–3.86)
URINE CULTURE, ROUTINE: ABNORMAL
URINE REFLEX TO CULTURE: YES
URINE REFLEX TO CULTURE: YES
UROBILINOGEN, URINE: 0.2 E.U./DL
UROBILINOGEN, URINE: 1 E.U./DL
WBC # BLD: 11.6 K/UL (ref 4.8–10.8)
WBC # BLD: 5.5 K/UL (ref 4.8–10.8)
WBC # BLD: 6.4 K/UL (ref 4.8–10.8)
WBC # BLD: 7.6 K/UL (ref 4.8–10.8)
WBC UA: >100 /HPF (ref 0–5)
WBC UA: >100 /HPF (ref 0–5)

## 2021-01-01 PROCEDURE — 84145 PROCALCITONIN (PCT): CPT

## 2021-01-01 PROCEDURE — 6360000002 HC RX W HCPCS: Performed by: INTERNAL MEDICINE

## 2021-01-01 PROCEDURE — 83690 ASSAY OF LIPASE: CPT

## 2021-01-01 PROCEDURE — 6370000000 HC RX 637 (ALT 250 FOR IP): Performed by: INTERNAL MEDICINE

## 2021-01-01 PROCEDURE — 96367 TX/PROPH/DG ADDL SEQ IV INF: CPT

## 2021-01-01 PROCEDURE — 6360000002 HC RX W HCPCS: Performed by: PERSONAL EMERGENCY RESPONSE ATTENDANT

## 2021-01-01 PROCEDURE — 36415 COLL VENOUS BLD VENIPUNCTURE: CPT

## 2021-01-01 PROCEDURE — 87086 URINE CULTURE/COLONY COUNT: CPT

## 2021-01-01 PROCEDURE — 93005 ELECTROCARDIOGRAM TRACING: CPT | Performed by: PERSONAL EMERGENCY RESPONSE ATTENDANT

## 2021-01-01 PROCEDURE — 80053 COMPREHEN METABOLIC PANEL: CPT

## 2021-01-01 PROCEDURE — 2580000003 HC RX 258: Performed by: PHYSICIAN ASSISTANT

## 2021-01-01 PROCEDURE — 80048 BASIC METABOLIC PNL TOTAL CA: CPT

## 2021-01-01 PROCEDURE — 84443 ASSAY THYROID STIM HORMONE: CPT

## 2021-01-01 PROCEDURE — 87635 SARS-COV-2 COVID-19 AMP PRB: CPT

## 2021-01-01 PROCEDURE — 2060000000 HC ICU INTERMEDIATE R&B

## 2021-01-01 PROCEDURE — 2580000003 HC RX 258: Performed by: PERSONAL EMERGENCY RESPONSE ATTENDANT

## 2021-01-01 PROCEDURE — 2580000003 HC RX 258: Performed by: INTERNAL MEDICINE

## 2021-01-01 PROCEDURE — 2500000003 HC RX 250 WO HCPCS: Performed by: INTERNAL MEDICINE

## 2021-01-01 PROCEDURE — 51702 INSERT TEMP BLADDER CATH: CPT

## 2021-01-01 PROCEDURE — 83880 ASSAY OF NATRIURETIC PEPTIDE: CPT

## 2021-01-01 PROCEDURE — 81001 URINALYSIS AUTO W/SCOPE: CPT

## 2021-01-01 PROCEDURE — 2700000000 HC OXYGEN THERAPY PER DAY

## 2021-01-01 PROCEDURE — 92610 EVALUATE SWALLOWING FUNCTION: CPT

## 2021-01-01 PROCEDURE — 96365 THER/PROPH/DIAG IV INF INIT: CPT

## 2021-01-01 PROCEDURE — G0378 HOSPITAL OBSERVATION PER HR: HCPCS

## 2021-01-01 PROCEDURE — 6370000000 HC RX 637 (ALT 250 FOR IP): Performed by: PERSONAL EMERGENCY RESPONSE ATTENDANT

## 2021-01-01 PROCEDURE — 71045 X-RAY EXAM CHEST 1 VIEW: CPT

## 2021-01-01 PROCEDURE — 1210000000 HC MED SURG R&B

## 2021-01-01 PROCEDURE — 97535 SELF CARE MNGMENT TRAINING: CPT

## 2021-01-01 PROCEDURE — 87186 SC STD MICRODIL/AGAR DIL: CPT

## 2021-01-01 PROCEDURE — 6360000002 HC RX W HCPCS: Performed by: STUDENT IN AN ORGANIZED HEALTH CARE EDUCATION/TRAINING PROGRAM

## 2021-01-01 PROCEDURE — 83735 ASSAY OF MAGNESIUM: CPT

## 2021-01-01 PROCEDURE — 85025 COMPLETE CBC W/AUTO DIFF WBC: CPT

## 2021-01-01 PROCEDURE — 6370000000 HC RX 637 (ALT 250 FOR IP): Performed by: OTOLARYNGOLOGY

## 2021-01-01 PROCEDURE — 84155 ASSAY OF PROTEIN SERUM: CPT

## 2021-01-01 PROCEDURE — 93010 ELECTROCARDIOGRAM REPORT: CPT | Performed by: INTERNAL MEDICINE

## 2021-01-01 PROCEDURE — 99285 EMERGENCY DEPT VISIT HI MDM: CPT

## 2021-01-01 PROCEDURE — 83605 ASSAY OF LACTIC ACID: CPT

## 2021-01-01 PROCEDURE — 97162 PT EVAL MOD COMPLEX 30 MIN: CPT

## 2021-01-01 PROCEDURE — 87040 BLOOD CULTURE FOR BACTERIA: CPT

## 2021-01-01 PROCEDURE — 84484 ASSAY OF TROPONIN QUANT: CPT

## 2021-01-01 PROCEDURE — 97166 OT EVAL MOD COMPLEX 45 MIN: CPT

## 2021-01-01 PROCEDURE — 87077 CULTURE AEROBIC IDENTIFY: CPT

## 2021-01-01 PROCEDURE — 85730 THROMBOPLASTIN TIME PARTIAL: CPT

## 2021-01-01 PROCEDURE — 96366 THER/PROPH/DIAG IV INF ADDON: CPT

## 2021-01-01 PROCEDURE — 1200000002 HC SEMI PRIVATE SWING BED

## 2021-01-01 PROCEDURE — 84165 PROTEIN E-PHORESIS SERUM: CPT

## 2021-01-01 PROCEDURE — 85610 PROTHROMBIN TIME: CPT

## 2021-01-01 PROCEDURE — 96372 THER/PROPH/DIAG INJ SC/IM: CPT

## 2021-01-01 PROCEDURE — 6360000002 HC RX W HCPCS: Performed by: PHYSICIAN ASSISTANT

## 2021-01-01 PROCEDURE — 92526 ORAL FUNCTION THERAPY: CPT

## 2021-01-01 PROCEDURE — 93005 ELECTROCARDIOGRAM TRACING: CPT | Performed by: INTERNAL MEDICINE

## 2021-01-01 PROCEDURE — 70450 CT HEAD/BRAIN W/O DYE: CPT

## 2021-01-01 PROCEDURE — 82550 ASSAY OF CK (CPK): CPT

## 2021-01-01 RX ORDER — TAMSULOSIN HYDROCHLORIDE 0.4 MG/1
0.4 CAPSULE ORAL DAILY
Status: DISCONTINUED | OUTPATIENT
Start: 2021-01-01 | End: 2021-01-01 | Stop reason: HOSPADM

## 2021-01-01 RX ORDER — CEPHALEXIN 250 MG/5ML
500 POWDER, FOR SUSPENSION ORAL EVERY 12 HOURS SCHEDULED
Status: DISCONTINUED | OUTPATIENT
Start: 2021-01-01 | End: 2021-01-01 | Stop reason: HOSPADM

## 2021-01-01 RX ORDER — ONDANSETRON 2 MG/ML
4 INJECTION INTRAMUSCULAR; INTRAVENOUS EVERY 6 HOURS PRN
Status: DISCONTINUED | OUTPATIENT
Start: 2021-01-01 | End: 2021-01-01 | Stop reason: HOSPADM

## 2021-01-01 RX ORDER — FINASTERIDE 5 MG/1
5 TABLET, FILM COATED ORAL DAILY
Status: CANCELLED | OUTPATIENT
Start: 2021-01-01

## 2021-01-01 RX ORDER — PROMETHAZINE HYDROCHLORIDE 12.5 MG/1
12.5 TABLET ORAL EVERY 6 HOURS PRN
Status: DISCONTINUED | OUTPATIENT
Start: 2021-01-01 | End: 2021-01-01 | Stop reason: HOSPADM

## 2021-01-01 RX ORDER — SODIUM CHLORIDE 0.9 % (FLUSH) 0.9 %
5-40 SYRINGE (ML) INJECTION EVERY 12 HOURS SCHEDULED
Status: DISCONTINUED | OUTPATIENT
Start: 2021-01-01 | End: 2021-01-01 | Stop reason: HOSPADM

## 2021-01-01 RX ORDER — SODIUM CHLORIDE 0.9 % (FLUSH) 0.9 %
5-40 SYRINGE (ML) INJECTION PRN
Status: CANCELLED | OUTPATIENT
Start: 2021-01-01

## 2021-01-01 RX ORDER — SODIUM CHLORIDE 9 MG/ML
25 INJECTION, SOLUTION INTRAVENOUS PRN
Status: DISCONTINUED | OUTPATIENT
Start: 2021-01-01 | End: 2021-01-01

## 2021-01-01 RX ORDER — ACETAMINOPHEN 325 MG/1
650 TABLET ORAL EVERY 4 HOURS PRN
Status: CANCELLED | OUTPATIENT
Start: 2021-01-01

## 2021-01-01 RX ORDER — LEUCINE, PHENYLALANINE, LYSINE, METHIONINE, ISOLEUCINE, VALINE, HISTIDINE, THREONINE, TRYPTOPHAN, ALANINE, GLYCINE, ARGININE, PROLINE, SERINE, TYROSINE, SODIUM ACETATE, DIBASIC POTASSIUM PHOSPHATE, MAGNESIUM CHLORIDE, SODIUM CHLORIDE, CALCIUM CHLORIDE, DEXTROSE 311; 238; 247; 170; 255; 247; 204; 179; 77; 880; 438; 489; 289; 213; 17; 297; 261; 51; 77; 33; 10 MG/100ML; MG/100ML; MG/100ML; MG/100ML; MG/100ML; MG/100ML; MG/100ML; MG/100ML; MG/100ML; MG/100ML; MG/100ML; MG/100ML; MG/100ML; MG/100ML; MG/100ML; MG/100ML; MG/100ML; MG/100ML; MG/100ML; MG/100ML; G/100ML
2000 INJECTION INTRAVENOUS CONTINUOUS
Status: DISCONTINUED | OUTPATIENT
Start: 2021-01-01 | End: 2021-01-01

## 2021-01-01 RX ORDER — CEFUROXIME AXETIL 500 MG/1
500 TABLET ORAL EVERY 12 HOURS SCHEDULED
Status: DISCONTINUED | OUTPATIENT
Start: 2021-01-01 | End: 2021-01-01 | Stop reason: HOSPADM

## 2021-01-01 RX ORDER — TEMAZEPAM 15 MG/1
15 CAPSULE ORAL NIGHTLY PRN
COMMUNITY

## 2021-01-01 RX ORDER — ACETAMINOPHEN 325 MG/1
650 TABLET ORAL EVERY 4 HOURS PRN
Status: DISCONTINUED | OUTPATIENT
Start: 2021-01-01 | End: 2021-01-01 | Stop reason: HOSPADM

## 2021-01-01 RX ORDER — SODIUM CHLORIDE 450 MG/100ML
INJECTION, SOLUTION INTRAVENOUS CONTINUOUS
Status: DISCONTINUED | OUTPATIENT
Start: 2021-01-01 | End: 2021-01-01 | Stop reason: HOSPADM

## 2021-01-01 RX ORDER — SODIUM CHLORIDE 0.9 % (FLUSH) 0.9 %
5-40 SYRINGE (ML) INJECTION PRN
Status: DISCONTINUED | OUTPATIENT
Start: 2021-01-01 | End: 2021-01-01 | Stop reason: HOSPADM

## 2021-01-01 RX ORDER — ONDANSETRON 2 MG/ML
4 INJECTION INTRAMUSCULAR; INTRAVENOUS EVERY 6 HOURS PRN
Status: CANCELLED | OUTPATIENT
Start: 2021-01-01

## 2021-01-01 RX ORDER — ONDANSETRON 4 MG/1
4 TABLET, ORALLY DISINTEGRATING ORAL EVERY 8 HOURS PRN
Status: DISCONTINUED | OUTPATIENT
Start: 2021-01-01 | End: 2021-01-01 | Stop reason: HOSPADM

## 2021-01-01 RX ORDER — LANOLIN ALCOHOL/MO/W.PET/CERES
3 CREAM (GRAM) TOPICAL NIGHTLY PRN
Status: DISCONTINUED | OUTPATIENT
Start: 2021-01-01 | End: 2021-01-01 | Stop reason: CLARIF

## 2021-01-01 RX ORDER — ZOLPIDEM TARTRATE 10 MG/1
10 TABLET ORAL NIGHTLY PRN
COMMUNITY

## 2021-01-01 RX ORDER — CEPHALEXIN 250 MG/5ML
500 POWDER, FOR SUSPENSION ORAL EVERY 12 HOURS SCHEDULED
Qty: 200 ML | Refills: 0 | Status: SHIPPED | OUTPATIENT
Start: 2021-01-01 | End: 2021-01-01

## 2021-01-01 RX ORDER — ACETAMINOPHEN 160 MG/5ML
650 SOLUTION ORAL ONCE
Status: COMPLETED | OUTPATIENT
Start: 2021-01-01 | End: 2021-01-01

## 2021-01-01 RX ORDER — FINASTERIDE 5 MG/1
5 TABLET, FILM COATED ORAL DAILY
Status: DISCONTINUED | OUTPATIENT
Start: 2021-01-01 | End: 2021-01-01 | Stop reason: HOSPADM

## 2021-01-01 RX ORDER — 0.9 % SODIUM CHLORIDE 0.9 %
1000 INTRAVENOUS SOLUTION INTRAVENOUS ONCE
Status: COMPLETED | OUTPATIENT
Start: 2021-01-01 | End: 2021-01-01

## 2021-01-01 RX ORDER — ACETAMINOPHEN 325 MG/1
650 TABLET ORAL EVERY 6 HOURS PRN
Status: DISCONTINUED | OUTPATIENT
Start: 2021-01-01 | End: 2021-01-01 | Stop reason: HOSPADM

## 2021-01-01 RX ORDER — LEVOTHYROXINE SODIUM 0.1 MG/1
100 TABLET ORAL DAILY
Status: DISCONTINUED | OUTPATIENT
Start: 2021-01-01 | End: 2021-01-01 | Stop reason: HOSPADM

## 2021-01-01 RX ORDER — ONDANSETRON 4 MG/1
4 TABLET, ORALLY DISINTEGRATING ORAL EVERY 8 HOURS PRN
Status: CANCELLED | OUTPATIENT
Start: 2021-01-01

## 2021-01-01 RX ORDER — LIDOCAINE HYDROCHLORIDE 20 MG/ML
JELLY TOPICAL PRN
Status: CANCELLED | OUTPATIENT
Start: 2021-01-01

## 2021-01-01 RX ORDER — SODIUM CHLORIDE 0.9 % (FLUSH) 0.9 %
5-40 SYRINGE (ML) INJECTION EVERY 12 HOURS SCHEDULED
Status: CANCELLED | OUTPATIENT
Start: 2021-01-01

## 2021-01-01 RX ORDER — LIDOCAINE HYDROCHLORIDE 20 MG/ML
JELLY TOPICAL PRN
Status: DISCONTINUED | OUTPATIENT
Start: 2021-01-01 | End: 2021-01-01 | Stop reason: HOSPADM

## 2021-01-01 RX ORDER — ACETAMINOPHEN 325 MG/1
650 TABLET ORAL EVERY 4 HOURS PRN
Status: DISCONTINUED | OUTPATIENT
Start: 2021-01-01 | End: 2021-01-01 | Stop reason: ALTCHOICE

## 2021-01-01 RX ORDER — SODIUM CHLORIDE 0.9 % (FLUSH) 0.9 %
10 SYRINGE (ML) INJECTION PRN
Status: DISCONTINUED | OUTPATIENT
Start: 2021-01-01 | End: 2021-01-01 | Stop reason: HOSPADM

## 2021-01-01 RX ORDER — HALOPERIDOL 5 MG/ML
0.5 INJECTION INTRAMUSCULAR PRN
Status: DISCONTINUED | OUTPATIENT
Start: 2021-01-01 | End: 2021-01-01 | Stop reason: HOSPADM

## 2021-01-01 RX ORDER — DEXTROSE, SODIUM CHLORIDE, SODIUM LACTATE, POTASSIUM CHLORIDE, AND CALCIUM CHLORIDE 5; .6; .31; .03; .02 G/100ML; G/100ML; G/100ML; G/100ML; G/100ML
INJECTION, SOLUTION INTRAVENOUS CONTINUOUS
Status: DISCONTINUED | OUTPATIENT
Start: 2021-01-01 | End: 2021-01-01

## 2021-01-01 RX ORDER — POLYETHYLENE GLYCOL 3350 17 G/17G
17 POWDER, FOR SOLUTION ORAL DAILY PRN
Status: DISCONTINUED | OUTPATIENT
Start: 2021-01-01 | End: 2021-01-01 | Stop reason: HOSPADM

## 2021-01-01 RX ORDER — CHOLECALCIFEROL (VITAMIN D3) 125 MCG
5 CAPSULE ORAL NIGHTLY PRN
Status: DISCONTINUED | OUTPATIENT
Start: 2021-01-01 | End: 2021-01-01 | Stop reason: HOSPADM

## 2021-01-01 RX ORDER — ACETAMINOPHEN 500 MG
1000 TABLET ORAL ONCE
Status: DISCONTINUED | OUTPATIENT
Start: 2021-01-01 | End: 2021-01-01

## 2021-01-01 RX ORDER — SODIUM CHLORIDE 0.9 % (FLUSH) 0.9 %
5-40 SYRINGE (ML) INJECTION EVERY 12 HOURS SCHEDULED
Status: DISCONTINUED | OUTPATIENT
Start: 2021-01-01 | End: 2021-01-01

## 2021-01-01 RX ORDER — ACETAMINOPHEN 650 MG/1
650 SUPPOSITORY RECTAL EVERY 6 HOURS PRN
Status: DISCONTINUED | OUTPATIENT
Start: 2021-01-01 | End: 2021-01-01 | Stop reason: HOSPADM

## 2021-01-01 RX ORDER — OYSTER SHELL CALCIUM WITH VITAMIN D 500; 200 MG/1; [IU]/1
1 TABLET, FILM COATED ORAL DAILY
Status: CANCELLED | OUTPATIENT
Start: 2021-01-01

## 2021-01-01 RX ORDER — CEFUROXIME AXETIL 500 MG/1
500 TABLET ORAL EVERY 12 HOURS SCHEDULED
Qty: 20 TABLET | Refills: 0 | Status: SHIPPED | OUTPATIENT
Start: 2021-01-01 | End: 2021-01-01

## 2021-01-01 RX ORDER — LEVOTHYROXINE SODIUM 0.1 MG/1
100 TABLET ORAL DAILY
Status: CANCELLED | OUTPATIENT
Start: 2021-01-01

## 2021-01-01 RX ORDER — SODIUM CHLORIDE 0.9 % (FLUSH) 0.9 %
10 SYRINGE (ML) INJECTION EVERY 12 HOURS SCHEDULED
Status: DISCONTINUED | OUTPATIENT
Start: 2021-01-01 | End: 2021-01-01 | Stop reason: HOSPADM

## 2021-01-01 RX ORDER — OYSTER SHELL CALCIUM WITH VITAMIN D 500; 200 MG/1; [IU]/1
1 TABLET, FILM COATED ORAL DAILY
Status: DISCONTINUED | OUTPATIENT
Start: 2021-01-01 | End: 2021-01-01 | Stop reason: HOSPADM

## 2021-01-01 RX ORDER — CEPHALEXIN 250 MG/5ML
500 POWDER, FOR SUSPENSION ORAL EVERY 12 HOURS SCHEDULED
Status: CANCELLED | OUTPATIENT
Start: 2021-01-01

## 2021-01-01 RX ADMIN — Medication 10 ML: at 20:51

## 2021-01-01 RX ADMIN — FINASTERIDE 5 MG: 5 TABLET, FILM COATED ORAL at 08:47

## 2021-01-01 RX ADMIN — SODIUM BICARBONATE: 84 INJECTION, SOLUTION INTRAVENOUS at 18:30

## 2021-01-01 RX ADMIN — LEUCINE, PHENYLALANINE, LYSINE, METHIONINE, ISOLEUCINE, VALINE, HISTIDINE, THREONINE, TRYPTOPHAN, ALANINE, GLYCINE, ARGININE, PROLINE, SERINE, TYROSINE, SODIUM ACETATE, DIBASIC POTASSIUM PHOSPHATE, MAGNESIUM CHLORIDE, SODIUM CHLORIDE, CALCIUM CHLORIDE, DEXTROSE 2000 ML
311; 238; 247; 170; 255; 247; 204; 179; 77; 880; 438; 489; 289; 213; 17; 297; 261; 51; 77; 33; 10 INJECTION INTRAVENOUS at 14:00

## 2021-01-01 RX ADMIN — LEUCINE, PHENYLALANINE, LYSINE, METHIONINE, ISOLEUCINE, VALINE, HISTIDINE, THREONINE, TRYPTOPHAN, ALANINE, GLYCINE, ARGININE, PROLINE, SERINE, TYROSINE, SODIUM ACETATE, DIBASIC POTASSIUM PHOSPHATE, MAGNESIUM CHLORIDE, SODIUM CHLORIDE, CALCIUM CHLORIDE, DEXTROSE 2000 ML
311; 238; 247; 170; 255; 247; 204; 179; 77; 880; 438; 489; 289; 213; 17; 297; 261; 51; 77; 33; 10 INJECTION INTRAVENOUS at 16:42

## 2021-01-01 RX ADMIN — SODIUM CHLORIDE: 4.5 INJECTION, SOLUTION INTRAVENOUS at 20:49

## 2021-01-01 RX ADMIN — CALCIUM CARBONATE-VITAMIN D TAB 500 MG-200 UNIT 1 TABLET: 500-200 TAB at 08:57

## 2021-01-01 RX ADMIN — CEFTRIAXONE SODIUM 1000 MG: 1 INJECTION, POWDER, FOR SOLUTION INTRAMUSCULAR; INTRAVENOUS at 20:52

## 2021-01-01 RX ADMIN — SODIUM BICARBONATE: 84 INJECTION, SOLUTION INTRAVENOUS at 18:28

## 2021-01-01 RX ADMIN — Medication 3 DROP: at 19:58

## 2021-01-01 RX ADMIN — CALCIUM CARBONATE-VITAMIN D TAB 500 MG-200 UNIT 1 TABLET: 500-200 TAB at 10:12

## 2021-01-01 RX ADMIN — CEPHALEXIN 500 MG: 250 FOR SUSPENSION ORAL at 19:56

## 2021-01-01 RX ADMIN — CEPHALEXIN 500 MG: 250 FOR SUSPENSION ORAL at 08:04

## 2021-01-01 RX ADMIN — FINASTERIDE 5 MG: 5 TABLET, FILM COATED ORAL at 10:11

## 2021-01-01 RX ADMIN — CEFTRIAXONE SODIUM 1000 MG: 1 INJECTION, POWDER, FOR SOLUTION INTRAMUSCULAR; INTRAVENOUS at 21:10

## 2021-01-01 RX ADMIN — Medication 3 DROP: at 08:04

## 2021-01-01 RX ADMIN — LEUCINE, PHENYLALANINE, LYSINE, METHIONINE, ISOLEUCINE, VALINE, HISTIDINE, THREONINE, TRYPTOPHAN, ALANINE, GLYCINE, ARGININE, PROLINE, SERINE, TYROSINE, SODIUM ACETATE, DIBASIC POTASSIUM PHOSPHATE, MAGNESIUM CHLORIDE, SODIUM CHLORIDE, CALCIUM CHLORIDE, DEXTROSE 2000 ML
311; 238; 247; 170; 255; 247; 204; 179; 77; 880; 438; 489; 289; 213; 17; 297; 261; 51; 77; 33; 10 INJECTION INTRAVENOUS at 12:08

## 2021-01-01 RX ADMIN — SODIUM CHLORIDE 1000 ML: 9 INJECTION, SOLUTION INTRAVENOUS at 19:04

## 2021-01-01 RX ADMIN — LEVOTHYROXINE SODIUM 100 MCG: 0.1 TABLET ORAL at 05:43

## 2021-01-01 RX ADMIN — ACETAMINOPHEN ORAL SOLUTION 650 MG: 325 SOLUTION ORAL at 20:26

## 2021-01-01 RX ADMIN — LEVOTHYROXINE SODIUM 100 MCG: 0.1 TABLET ORAL at 10:11

## 2021-01-01 RX ADMIN — Medication 10 ML: at 19:37

## 2021-01-01 RX ADMIN — SODIUM BICARBONATE: 84 INJECTION, SOLUTION INTRAVENOUS at 03:52

## 2021-01-01 RX ADMIN — SODIUM CHLORIDE 1000 ML: 9 INJECTION, SOLUTION INTRAVENOUS at 18:56

## 2021-01-01 RX ADMIN — Medication 1 TABLET: at 08:04

## 2021-01-01 RX ADMIN — CEFTRIAXONE SODIUM 1000 MG: 1 INJECTION, POWDER, FOR SOLUTION INTRAMUSCULAR; INTRAVENOUS at 19:04

## 2021-01-01 RX ADMIN — CARBAMIDE PEROXIDE 6.5% 3 DROP: 6.5 LIQUID AURICULAR (OTIC) at 08:35

## 2021-01-01 RX ADMIN — SODIUM BICARBONATE: 84 INJECTION, SOLUTION INTRAVENOUS at 08:56

## 2021-01-01 RX ADMIN — Medication 3 MG: at 23:00

## 2021-01-01 RX ADMIN — TAMSULOSIN HYDROCHLORIDE 0.4 MG: 0.4 CAPSULE ORAL at 10:22

## 2021-01-01 RX ADMIN — HALOPERIDOL LACTATE 0.5 MG: 5 INJECTION, SOLUTION INTRAMUSCULAR at 23:57

## 2021-01-01 RX ADMIN — CEFTRIAXONE SODIUM 1000 MG: 1 INJECTION, POWDER, FOR SOLUTION INTRAMUSCULAR; INTRAVENOUS at 17:22

## 2021-01-01 RX ADMIN — TAMSULOSIN HYDROCHLORIDE 0.4 MG: 0.4 CAPSULE ORAL at 20:52

## 2021-01-01 RX ADMIN — SODIUM BICARBONATE: 84 INJECTION, SOLUTION INTRAVENOUS at 17:10

## 2021-01-01 RX ADMIN — SODIUM BICARBONATE: 84 INJECTION, SOLUTION INTRAVENOUS at 04:07

## 2021-01-01 RX ADMIN — CEFTRIAXONE SODIUM 1000 MG: 1 INJECTION, POWDER, FOR SOLUTION INTRAMUSCULAR; INTRAVENOUS at 21:59

## 2021-01-01 RX ADMIN — FINASTERIDE 5 MG: 5 TABLET, FILM COATED ORAL at 08:57

## 2021-01-01 RX ADMIN — FINASTERIDE 5 MG: 5 TABLET, FILM COATED ORAL at 08:04

## 2021-01-01 RX ADMIN — LIDOCAINE HYDROCHLORIDE: 20 JELLY TOPICAL at 22:15

## 2021-01-01 RX ADMIN — SODIUM BICARBONATE: 84 INJECTION, SOLUTION INTRAVENOUS at 08:47

## 2021-01-01 RX ADMIN — CEFTRIAXONE SODIUM 1000 MG: 1 INJECTION, POWDER, FOR SOLUTION INTRAMUSCULAR; INTRAVENOUS at 19:37

## 2021-01-01 RX ADMIN — CEPHALEXIN 500 MG: 250 FOR SUSPENSION ORAL at 11:23

## 2021-01-01 RX ADMIN — Medication 10 ML: at 21:59

## 2021-01-01 RX ADMIN — CALCIUM CARBONATE-VITAMIN D TAB 500 MG-200 UNIT 1 TABLET: 500-200 TAB at 08:47

## 2021-01-01 RX ADMIN — LEUCINE, PHENYLALANINE, LYSINE, METHIONINE, ISOLEUCINE, VALINE, HISTIDINE, THREONINE, TRYPTOPHAN, ALANINE, GLYCINE, ARGININE, PROLINE, SERINE, TYROSINE, SODIUM ACETATE, DIBASIC POTASSIUM PHOSPHATE, MAGNESIUM CHLORIDE, SODIUM CHLORIDE, CALCIUM CHLORIDE, DEXTROSE 2000 ML
311; 238; 247; 170; 255; 247; 204; 179; 77; 880; 438; 489; 289; 213; 17; 297; 261; 51; 77; 33; 10 INJECTION INTRAVENOUS at 10:46

## 2021-01-01 RX ADMIN — FINASTERIDE 5 MG: 5 TABLET, FILM COATED ORAL at 20:51

## 2021-01-01 RX ADMIN — ENOXAPARIN SODIUM 40 MG: 40 INJECTION SUBCUTANEOUS at 08:31

## 2021-01-01 RX ADMIN — Medication 10 ML: at 19:52

## 2021-01-01 RX ADMIN — ENOXAPARIN SODIUM 40 MG: 40 INJECTION SUBCUTANEOUS at 08:04

## 2021-01-01 RX ADMIN — ENOXAPARIN SODIUM 40 MG: 40 INJECTION SUBCUTANEOUS at 20:43

## 2021-01-01 RX ADMIN — FINASTERIDE 5 MG: 5 TABLET, FILM COATED ORAL at 08:31

## 2021-01-01 RX ADMIN — LEVOTHYROXINE SODIUM 100 MCG: 0.1 TABLET ORAL at 07:22

## 2021-01-01 RX ADMIN — LEVOTHYROXINE SODIUM 100 MCG: 0.1 TABLET ORAL at 05:24

## 2021-01-01 RX ADMIN — CEFTRIAXONE SODIUM 1000 MG: 1 INJECTION, POWDER, FOR SOLUTION INTRAMUSCULAR; INTRAVENOUS at 21:58

## 2021-01-01 RX ADMIN — IRON SUCROSE 100 MG: 20 INJECTION, SOLUTION INTRAVENOUS at 12:58

## 2021-01-01 RX ADMIN — LEVOTHYROXINE SODIUM 100 MCG: 0.1 TABLET ORAL at 07:36

## 2021-01-01 RX ADMIN — LEUCINE, PHENYLALANINE, LYSINE, METHIONINE, ISOLEUCINE, VALINE, HISTIDINE, THREONINE, TRYPTOPHAN, ALANINE, GLYCINE, ARGININE, PROLINE, SERINE, TYROSINE, SODIUM ACETATE, DIBASIC POTASSIUM PHOSPHATE, MAGNESIUM CHLORIDE, SODIUM CHLORIDE, CALCIUM CHLORIDE, DEXTROSE 2000 ML
311; 238; 247; 170; 255; 247; 204; 179; 77; 880; 438; 489; 289; 213; 17; 297; 261; 51; 77; 33; 10 INJECTION INTRAVENOUS at 17:10

## 2021-01-01 RX ADMIN — CEFTRIAXONE SODIUM 1000 MG: 1 INJECTION, POWDER, FOR SOLUTION INTRAMUSCULAR; INTRAVENOUS at 19:52

## 2021-01-01 RX ADMIN — ENOXAPARIN SODIUM 40 MG: 40 INJECTION SUBCUTANEOUS at 10:12

## 2021-01-01 RX ADMIN — Medication 5 ML: at 23:21

## 2021-01-01 RX ADMIN — SODIUM BICARBONATE: 84 INJECTION, SOLUTION INTRAVENOUS at 23:15

## 2021-01-01 RX ADMIN — CARBAMIDE PEROXIDE 6.5% 3 DROP: 6.5 LIQUID AURICULAR (OTIC) at 21:11

## 2021-01-01 RX ADMIN — FINASTERIDE 5 MG: 5 TABLET, FILM COATED ORAL at 10:22

## 2021-01-01 ASSESSMENT — ENCOUNTER SYMPTOMS
CONSTIPATION: 0
SORE THROAT: 0
NAUSEA: 0
BLOOD IN STOOL: 0
ABDOMINAL DISTENTION: 0
SHORTNESS OF BREATH: 0
DIARRHEA: 0
SHORTNESS OF BREATH: 0
VOMITING: 0
EYE DISCHARGE: 0
COLOR CHANGE: 0
ABDOMINAL PAIN: 0
RHINORRHEA: 0
COUGH: 0
ABDOMINAL PAIN: 0
SORE THROAT: 0
RHINORRHEA: 0
COLOR CHANGE: 0

## 2021-01-01 ASSESSMENT — PAIN SCALES - GENERAL
PAINLEVEL_OUTOF10: 0
PAINLEVEL_OUTOF10: 1
PAINLEVEL_OUTOF10: 0

## 2021-01-01 ASSESSMENT — PAIN SCALES - PAIN ASSESSMENT IN ADVANCED DEMENTIA (PAINAD)
FACIALEXPRESSION: 0
NEGVOCALIZATION: 1
CONSOLABILITY: 0
BODYLANGUAGE: 0
CONSOLABILITY: 0
BODYLANGUAGE: 0
FACIALEXPRESSION: 0
NEGVOCALIZATION: 1
BREATHING: 0
TOTALSCORE: 1
BREATHING: 0
TOTALSCORE: 1

## 2021-01-01 ASSESSMENT — PAIN SCALES - WONG BAKER: WONGBAKER_NUMERICALRESPONSE: 2

## 2021-02-16 PROBLEM — N39.0 UTI (URINARY TRACT INFECTION): Status: ACTIVE | Noted: 2021-01-01

## 2021-02-16 NOTE — ED NOTES
Bed: 11  Expected date: 2/16/21  Expected time:   Means of arrival:   Comments:  80year old male sent by home health  for possible uti.  121/71,  a fib, no iv     April L [facility]  02/16/21 0963

## 2021-02-16 NOTE — ACP (ADVANCE CARE PLANNING)
NO changes in advanced care planning. HC POA and Living will papers are in medica in [address].

## 2021-02-16 NOTE — ED PROVIDER NOTES
[address] [facility] ED  eMERGENCY dEPARTMENT eNCOUnter      Pt Name: Vanessa Lloyd  MRN: [id number]  [place] 2/22/1923  Date of evaluation: 2/16/2021  Provider: Severa Peaches, PA-C    CHIEF COMPLAINT       Chief Complaint   Patient presents with    Urinary Tract Infection     per home health nurse          HISTORY OF PRESENT ILLNESS   (Location/Symptom, Timing/Onset,Context/Setting, Quality, Duration, Modifying Factors, Severity)  Note limiting factors. Vanessa Lloyd is a 80 y.o. male who presents to the emergency department for concerns for urinary tract infection per patient's home health care nurse. She has not present with the patient on arrival to the hospital, he is extremely hard of hearing, he denies any complaint during my evaluation. HPI    NursingNotes were reviewed. REVIEW OF SYSTEMS    (2-9 systems for level 4, 10 or more for level 5)     Review of Systems   Constitutional: Negative for activity change and appetite change. HENT: Negative for congestion, ear discharge, ear pain, nosebleeds, rhinorrhea and sore throat. Eyes: Negative for discharge. Respiratory: Negative for shortness of breath. Cardiovascular: Negative for chest pain, palpitations and leg swelling. Gastrointestinal: Negative for abdominal distention, abdominal pain and constipation. Genitourinary: Negative for difficulty urinating and dysuria. Musculoskeletal: Negative for arthralgias. Skin: Negative for color change. Neurological: Negative for dizziness, syncope, numbness and headaches. Psychiatric/Behavioral: Negative for agitation and confusion. Except as noted above the remainder of the review of systems was reviewed and negative. PAST MEDICAL HISTORY     Past Medical History:   Diagnosis Date    Cancer [facility])     skin CA left ear    Hypertension          SURGICALHISTORY     History reviewed. No pertinent surgical history.       CURRENT MEDICATIONS       Previous Medications FINASTERIDE (PROSCAR) 5 MG TABLET    Take 1 tablet by mouth daily    LEVOTHYROXINE (SYNTHROID) 100 MCG TABLET    Take 100 mcg by mouth Daily    TAMSULOSIN (FLOMAX) 0.4 MG CAPSULE    Take 1 capsule by mouth daily       ALLERGIES     Patient has no known allergies. FAMILY HISTORY     History reviewed. No pertinent family history. SOCIAL HISTORY       Social History     Socioeconomic History    Marital status:      Spouse name: None    Number of children: None    Years of education: None    Highest education level: None   Occupational History    None   Social Needs    Financial resource strain: None    Food insecurity     Worry: None     Inability: None    Transportation needs     Medical: None     Non-medical: None   Tobacco Use    Smoking status: Never Smoker    Smokeless tobacco: Never Used   Substance and Sexual Activity    Alcohol use: Not Currently    Drug use: Never    Sexual activity: Never   Lifestyle    Physical activity     Days per week: None     Minutes per session: None    Stress: None   Relationships    Social connections     Talks on phone: None     Gets together: None     Attends Amish service: None     Active member of club or organization: None     Attends meetings of clubs or organizations: None     Relationship status: None    Intimate partner violence     Fear of current or ex partner: None     Emotionally abused: None     Physically abused: None     Forced sexual activity: None   Other Topics Concern    None   Social History Narrative    None       SCREENINGS      @FLOW([id number])@      PHYSICAL EXAM    (up to 7 for level 4, 8 or more for level 5)     ED Triage Vitals [02/16/21 1530]   BP Temp Temp Source Pulse Resp SpO2 Height Weight   (!) 129/54 98.2 °F (36.8 °C) Oral 60 16 99 % 5' 6\" (1.676 m) 140 lb (63.5 kg)       Physical Exam  Vitals signs and nursing note reviewed. Constitutional:       General: He is not in acute distress.      Appearance: He is Resp: 16   Temp: 98.2 °F (36.8 °C)   TempSrc: Oral   SpO2: 99%   Weight: 140 lb (63.5 kg)   Height: 5' 6\" (1.676 m)          MDM  Number of Diagnoses or Management Options  Confusion  Urinary tract infection without hematuria, site unspecified  Diagnosis management comments: Patient presented to ED with concerns for urinary tract infection. According to son patient was acting confused today, his home health care nurse tested his urine and it showed to be infected. Therefore he was sent to the emergency department. Upon arrival patient is alert, he is extremely hard of hearing it is difficult to obtain any information from the patient himself as he cannot hear or understand what I am saying. Son states that he was acting differently today, and was transported to the ER, I did speak with his regular family physician Dr. Kamla Alegria, he would like patient admitted to the hospital for further evaluation and management of urinary tract infection, altered mental status confusion. CRITICAL CARE TIME   Total Critical Care time was 0 minutes, excluding separately reportableprocedures. There was a high probability of clinicallysignificant/life threatening deterioration in the patient's condition which required my urgent intervention. CONSULTS:  None    PROCEDURES:  Unless otherwise noted below, none     Procedures    FINAL IMPRESSION      1. Urinary tract infection without hematuria, site unspecified    2. Confusion          DISPOSITION/PLAN   DISPOSITION Decision To Admit 02/16/2021 05:14:54 PM      PATIENT REFERRED TO:  No follow-up provider specified.     DISCHARGE MEDICATIONS:  New Prescriptions    No medications on file          (Please note that portions of this note were completed with a voice recognition program.  Efforts were made to edit the dictations but occasionally words are mis-transcribed.)    Tammie Kirkland PA-C (electronically signed)  Attending Emergency Physician         Tammie Kirkland TYLER  02/16/21 1712

## 2021-02-16 NOTE — CARE COORDINATION
[facility] Case Management Initial Discharge Assessment    Met with Family, SON BRAYDON Espino discuss discharge plan. PCP: Ailyn Shabazz, DO               Date of Last Visit: VIRTUAL MORE THAN 2 MONTHS AGO    If no PCP, list provided? N/A    Discharge Planning    Living Arrangements: at home dependent on family care    Who do you live with? SON    Who helps you with your care:  family    If lives at home:     Do you have any barriers navigating in your home? no    Patient can perform ADL? No    Current Services (outpatient and in home) :  Private [employer] Help/Aides (2021 Abilio Sanz HERSELF)    Dialysis: No    Is transportation available to get to your appointments? Yes    DME Equipment:  yes - Sidonie Crease, ELEVATED TOILET SEAT    Respiratory equipment: None    Respiratory provider:  no     Pharmacy:  yes - [address] [place] [address],[facility] Floor with Medication Assistance Program?  No      [facility] Yes, [address] A MONTH TO CHANGE HIS CATHETER    Patient agreeable to SNF/Rehab? Yes, Company SON PREFERS TO [facility]    Other discharge needs identified? Other TBD    [place] of choice list provided with basic dialogue that supports the patient's individualized plan of care/goals and shares the quality data associated with the providers. Yes    Does Patient Have a High-Risk for Readmission Diagnosis (CHF, PN, MI, COPD)? No      The plan for Transition of Care is related to the following treatment goals: UTI TREATMENT    Initial Discharge Plan? (Note: please see concurrent daily documentation for any updates after initial note). BACK TO HOME WITH [facility] AND HIS AID    The Patient and/or patient representative: SON was provided with choice of any post-acute providers for care and equipment and agrees with discharge plan  Yes    Electronically signed by Timi Rothman RN on 2/16/2021 at 5:32 PM

## 2021-02-17 PROBLEM — G93.40 ENCEPHALOPATHY: Status: ACTIVE | Noted: 2021-01-01

## 2021-02-17 NOTE — PROGRESS NOTES
2000: Spoke with son and Renée Donis, on the phone. Medications reviewed. Patient lives with son, who handles patients medications with [facility]. He drinks thickened liquids at home and pureed foods. Patient takes medications crushed in applesauce. Patient has 1 hearing aid in R ear. No L ear present related to cancer. Patient ambulates with a walker at home. Chronic esposito in place which was just replaced by [facility] this week. Patient is extremely confused and is currently a 1:1 as he was attempting to get out of bed. Assessment complete. Alert to person only, constantly screaming \"the drain is going to take us! \" He is resting comfortably. Call light within reach. Safety maintained. 2200: Patient is becoming increasingly agitated. Sodeinde, covering for [employer], was messaged regarding medication for agitation. No new orders at this time. 0000: Spoke with Dr. Annemarie Aquino, new order placed for 0.5 Haldol IM PRN x2. Also requested a psych consult r/t agitation. Given per [facility].     0600: Patient refused AM Synthroid. Will not take food or drink at this time. 700mL emptied from chronic esposito. Patient still confused. 1:1 still in place.        Electronically signed by Zeynep Saldivar RN on 2/16/2021 at 8:30 PM

## 2021-02-17 NOTE — H&P
H&P dictated  Discharge later today if eats well  Continue Ceftin 500mg bid 7 days
Job#: [id number]     Doc#: [id number]    CC:

## 2021-02-17 NOTE — CARE COORDINATION
Spoke with son he requesting cot transport home. He was informed he may be charged for this he is ok with that and I did attempt to see if [facility] would cover transport. [facility]  thru [facility]. ([phone] )Only transports via w/c and to medical appts occ makes exception but does not do cot he gave me [facility] # Maureen Lu [phone], message had to be left on her v.m. Per nursing pt is being d//cd after lunch and will resume care with [facility], they were notified no order for OMER needed pt is obs status. Electronically signed by Kalyan Otero RN on 2/17/2021 at 10:57 AM

## 2021-02-17 NOTE — CARE COORDINATION
lATE ENTRY: NURSING UNIT, ROOM NUMBER AND CORRESPONDING PHONE NUMBERS ALONG WITH THE  HIPPA CODE GIVEN TO THE son Rosa Negrete. HE WAS ALSO INFORMED THAT A NURSE WILL BE HAPPY TO UPDATE  ONE  DESIGNATED FAMILY MEMBER AND THAT FAMILY MEMBER CAN BE  THE FAMILY SPOKES PERSON.

## 2021-02-17 NOTE — CARE COORDINATION
Called [facility] to notify them of [facility]'s admission to [facility] and gave them the information they needed.

## 2021-02-19 NOTE — TELEPHONE ENCOUNTER
Pt son would like to know if his HFU can be done on the telephone rather than in office. [facility] takes care of the catheter care.  Please advise

## 2021-03-03 NOTE — TELEPHONE ENCOUNTER
Spoke with Chung Maldonado (Son) he would like to discuss the cath size change with his brother before having me call in the new order. Also, can a Urojet be used when the catheter is changed?  Please advise

## 2021-03-03 NOTE — TELEPHONE ENCOUNTER
----- Message from Godfrey Plaza sent at 3/2/2021  4:27 PM EST -----  Regarding: advice  Contact: [phone]  Patient  family is calling about patients upcoming cath change. They would like to know if he can have a smaller cath then an 18 due to it being painful changing it. Also they would like to know if it is smaller is it easier to plug up?  Please advise

## 2021-03-03 NOTE — TELEPHONE ENCOUNTER
Spoke with [facility] and was told that as long as it is sent to the pharmacy and the patient has it at the appt it can be used during the cath change.

## 2021-03-18 PROBLEM — N39.0 UTI (URINARY TRACT INFECTION): Status: RESOLVED | Noted: 2021-01-01 | Resolved: 2021-01-01

## 2021-06-12 PROBLEM — A41.9 SEPTICEMIA (HCC): Status: ACTIVE | Noted: 2021-01-01

## 2021-06-12 NOTE — ED PROVIDER NOTES
[address] [facility] ED  eMERGENCY dEPARTMENT eNCOUnter      Pt Name: So Rosen  MRN: [id number]  Amishagfkathryn 2/22/1923  Date of evaluation: 6/12/2021  Provider: BARRIE Rubin      HISTORY OF PRESENT ILLNESS    So Rosen is a 80 y.o. male with PMHx of left ear CA with surgical removal, HTN, hypothyroidism, indwelling esposito CA d./t BPH & urinary retention presents to the emergency department with confusion. Sons called ambulance today due to increased weakness, lethargy, confusion which started yesterday. No falls, cough, chest pain, shortness of breath, nausea, vomiting, diarrhea. HPI    Nursing Notes were reviewed. REVIEW OF SYSTEMS       Review of Systems   Constitutional: Negative for appetite change, chills and fever. HENT: Negative for congestion, rhinorrhea and sore throat. Respiratory: Negative for cough and shortness of breath. Cardiovascular: Negative for chest pain. Gastrointestinal: Negative for abdominal pain, blood in stool, diarrhea, nausea and vomiting. Genitourinary: Negative for difficulty urinating. Musculoskeletal: Negative for neck stiffness. Skin: Negative for color change and rash. Neurological: Negative for dizziness, syncope, weakness, light-headedness, numbness and headaches. Psychiatric/Behavioral: Positive for confusion. All other systems reviewed and are negative. PAST MEDICAL HISTORY     Past Medical History:   Diagnosis Date    Cancer [facility])     skin CA left ear    Hypertension          SURGICAL HISTORY     No past surgical history on file. CURRENT MEDICATIONS       Previous Medications    FINASTERIDE (PROSCAR) 5 MG TABLET    Take 1 tablet by mouth daily    LEVOTHYROXINE (SYNTHROID) 100 MCG TABLET    Take 100 mcg by mouth Daily    TAMSULOSIN (FLOMAX) 0.4 MG CAPSULE    Take 1 capsule by mouth daily       ALLERGIES     Patient has no known allergies. FAMILY HISTORY     No family history on file.        SOCIAL HISTORY Social History     Socioeconomic History    Marital status:      Spouse name: Not on file    Number of children: Not on file    Years of education: Not on file    Highest education level: Not on file   Occupational History    Not on file   Tobacco Use    Smoking status: Never Smoker    Smokeless tobacco: Never Used   Vaping Use    Vaping Use: Unknown   Substance and Sexual Activity    Alcohol use: Not Currently    Drug use: Never    Sexual activity: Never   Other Topics Concern    Not on file   Social History Narrative    Not on file     Social Determinants of Health     Financial Resource Strain:     Difficulty of Paying Living Expenses:    Food Insecurity:     Worried About Running Out of Food in the Last Year:     [address] in the Last Year:    Transportation Needs:     Lack of Transportation (Medical):  Lack of Transportation (Non-Medical):    Physical Activity:     Days of Exercise per Week:     Minutes of Exercise per Session:    Stress:     Feeling of Stress :    Social Connections:     Frequency of Communication with Friends and Family:     Frequency of Social Gatherings with Friends and Family:     Attends [employer] Services:     Active Member of Clubs or Organizations:     Attends Club or Organization Meetings:     Marital Status:    Intimate Partner Violence:     Fear of Current or Ex-Partner:     Emotionally Abused:     Physically Abused:     Sexually Abused:          PHYSICAL EXAM         ED Triage Vitals   BP Temp Temp src Pulse Resp SpO2 Height Weight   -- -- -- -- -- -- -- --       Physical Exam  Constitutional:       Appearance: He is well-developed. Comments: Agitated    HENT:      Head: Normocephalic and atraumatic. Eyes:      Conjunctiva/sclera: Conjunctivae normal.      Pupils: Pupils are equal, round, and reactive to light. Neck:      Trachea: No tracheal deviation. Cardiovascular:      Heart sounds: Normal heart sounds.    Pulmonary: Effort: Pulmonary effort is normal. No respiratory distress. Breath sounds: Normal breath sounds. No stridor. Abdominal:      General: Bowel sounds are normal. There is no distension. Palpations: Abdomen is soft. There is no mass. Tenderness: There is no abdominal tenderness. There is no guarding or rebound. Musculoskeletal:         General: Normal range of motion. Cervical back: Normal range of motion and neck supple. Skin:     General: Skin is warm and dry. Capillary Refill: Capillary refill takes less than 2 seconds. Findings: No rash. Neurological:      Mental Status: He is alert. He is disoriented. Deep Tendon Reflexes: Reflexes are normal and symmetric. Comments: agitated   Psychiatric:         Behavior: Behavior normal.         Thought Content:  Thought content normal.         Judgment: Judgment normal.         DIAGNOSTIC RESULTS     EKG:All EKG's are interpreted by the Emergency Department Physician who either signs or Co-signs this chart in the absence of a cardiologist.    Normal sinus rhythm, rate 91, normal intervals, no ST segment changes    RADIOLOGY:   Non-plain film images such as CT, Ultrasound and MRI are read by theradiologist. Plain radiographic images are visualized and preliminarily interpreted by the emergency physician with the below findings:    Interpretation per theRadiologist below, if available at the time of this note:    XR CHEST PORTABLE    (Results Pending)   CT Head WO Contrast    (Results Pending)           LABS:  Labs Reviewed   APTT - Abnormal; Notable for the following components:       Result Value    aPTT 38.3 (*)     All other components within normal limits   CBC WITH AUTO DIFFERENTIAL - Abnormal; Notable for the following components:    WBC 11.6 (*)     RBC 2.66 (*)     Hemoglobin 9.2 (*)     Hematocrit 26.8 (*)     .9 (*)     MCH 34.7 (*)     RDW 14.7 (*)     Neutrophils Absolute 10.2 (*)     Lymphocytes Absolute 0.9 (*)     All other components within normal limits   COMPREHENSIVE METABOLIC PANEL - Abnormal; Notable for the following components:    Sodium 134 (*)     Glucose 145 (*)     BUN 37 (*)     Total Bilirubin 0.8 (*)     Alkaline Phosphatase 106 (*)     Globulin 3.8 (*)     All other components within normal limits    Narrative:     Ashwin Mckeon tel. [phone],  TROP results called to and read back [place] [facility] [place]12/2021 20:01, by  Michael Nichols   LACTATE, SEPSIS - Abnormal; Notable for the following components:    Lactic Acid, Sepsis 2.6 (*)     All other components within normal limits   PROTIME-INR - Abnormal; Notable for the following components:    Protime 15.6 (*)     All other components within normal limits   TROPONIN - Abnormal; Notable for the following components:    Troponin 0.063 (*)     All other components within normal limits    Narrative:     Ashwin Mckeon tel. [phone],  TROP results called to and read back [place] [facility] [place] 06/12/2021 20:01, by  Michael Nichols   URINE RT REFLEX TO CULTURE - Abnormal; Notable for the following components:    Clarity, UA TURBID (*)     Blood, Urine SMALL (*)     Protein, UA >=300 (*)     Nitrite, Urine POSITIVE (*)     Leukocyte Esterase, Urine LARGE (*)     All other components within normal limits   MICROSCOPIC URINALYSIS - Abnormal; Notable for the following components:    Bacteria, UA MANY (*)     WBC, UA >100 (*)     All other components within normal limits   COVID-19, RAPID   CULTURE, BLOOD 2   CULTURE, BLOOD 1   CULTURE, URINE   BRAIN NATRIURETIC PEPTIDE    Narrative:     Ashwin Mckeon tel. [phone],  TROP results called to and read back [place] [facility] [place] 06/12/2021 20:01, by  Michael Nichols   CK    Narrative:     Ashwin Mckeon tel. [phone],  TROP results called to and read back [place] [facility] [place] 06/12/2021 20:01, by  Freddy Mata    Narrative:     Ashwin Mckeon tel. [phone],  TROP results called to and read back [place] [facility] [place] 06/12/2021 20:01, by  [facility]   MAGNESIUM    Narrative:     Katharina Essex tel. [phone],  TROP results called to and read back by [facility], 06/12/2021 20:01, by  [facility]   PROCALCITONIN    Narrative:     Katharina Essex tel. [phone],  [place]OP results called to and read back [place] [facility] [place]/12/2021 20:01, by  [facility]   TSH WITHOUT REFLEX    Narrative:     Katharina Essex tel. [phone],  TROP results called to and read back [place] [facility] 06/12/2021 20:01, by  [facility]   LACTIC ACID, PLASMA       All other labs were within normal range or not returned as of this dictation. EMERGENCY DEPARTMENT COURSE and DIFFERENTIAL DIAGNOSIS/MDM:   Vitals:    Vitals:    06/12/21 1830 06/12/21 1914   BP: (!) 149/115    Pulse: 81    Resp: 22    Temp: 101.4 °F (38.6 °C)    TempSrc: Temporal    SpO2: 96%    Weight:  140 lb (63.5 kg)         MDM    CT the head shows no acute process. Chest x-ray shows no obvious infiltrates. Sodium 134, BUN 37, lactic acid 2.6, troponin 0.063 (chronically elevated), WBC 11.6, hemoglobin 9.2 (chronically anemic). Patient does have a UTI. He was started on Rocephin and 1 L IV fluids. He was given Tylenol for fever. Patient is now resting comfortably in the cart after being given warm blankets, he initially was agitated. He will be admitted at this time. CRITICAL CARE TIME   Total Critical Caretime was 35 minutes, excluding separately reportable procedures. There was a high probability of clinically significant/life threatening deterioration in the patient's condition which required my urgent intervention. Procedures    FINAL IMPRESSION      1. Septicemia ([facility] [address]    2. Urinary tract infection associated with indwelling urethral catheter, initial encounter ([facility] [address]    3. Elevated troponin    4. Elevated lactic acid level          DISPOSITION/PLAN   DISPOSITION Decision To Admit 06/12/2021 07:27:17 PM      PATIENT REFERRED TO:  No follow-up provider specified.     DISCHARGE MEDICATIONS:  New Prescriptions    No medications on file          (Please notethat portions of this note were completed with a voice recognition program.  Efforts were made to edit the dictations but occasionally words are mis-transcribed. )    BARRIE Yu (electronically signed)  Emergency Physician Assistant         Anali Pennma  06/12/21 2056

## 2021-06-12 NOTE — ED NOTES
Bed: 12  Expected date:   Expected time:   Means of arrival:   Comments:  89M weakness, x 2 days, 125/73, 67, 13, 87RA, 94 4L, , IV

## 2021-06-12 NOTE — ED TRIAGE NOTES
Pt presents via EMS. Per EMS, family called with c/o worsening weakness and confusion, O2 placed on pt at 4L. Per family, patient has had increasing weakness x2 days. Per family, Patient normally can walk with a walker at home and was unable to walk today.

## 2021-06-13 NOTE — ED NOTES
Report given to Gauri [address] RN patient ready for transfer     Harryluke Danielle, DANIELA  06/12/21 6449

## 2021-06-13 NOTE — CARE COORDINATION
MET WITH 2 SONS AND PATIENT. HE IS APPROPRIATE WHEN SPEAKING TO HIS SONS. SON BRAYDON IS POA, BUT BOTH ASSIST WITH CARE. THEY HAVE CHOSEN REHAB VS TING, PENDING WHICHEVER PATIENT QUALIFIES FOR. REVIEWED IMM AND PLACED IN CHART. WILL CONTINUE TO FOLLOW. [facility] EMERGENCY [facility] AT [place] Case Management Initial Discharge Assessment    Met with Family to discuss discharge plan. PCP: Abbie Regan DO                                Date of Last Visit: \"FEB OR MARCH PHONE APPTS\"    If no PCP, list provided? N/A    Discharge Planning    Living Arrangements: at home dependent on family care and at home dependent on nursing care    Who do you live with? SON BRAYDON    Who helps you with your care:  family    If lives at home:     Do you have any barriers navigating in your home? no    Patient can perform ADL? No    Current Services (outpatient and in home) :  [facility] PRIVATE CARE GIVER    Dialysis: No    Is transportation available to get to your appointments? No, Discussed Options FAMILY DRIVES HIM    DME Equipment:  yes - WALKER, W/C, CHRONIC SUN    Respiratory equipment: None    Respiratory provider:  no     Pharmacy:  yes - [address] with Medication Assistance Program?  No      Patient agreeable to [address]? Yes, [address]    Patient agreeable to SNF/Rehab? Yes, [address]     Other discharge needs identified? N/A    Does Patient Have a High-Risk for Readmission Diagnosis (CHF, PN, MI, COPD)? No, HX OF HTN    Initial Discharge Plan? (Note: please see concurrent daily documentation for any updates after initial note). REHAB VS TING. HOME AFTER W/[facility]. WILL CONTINUE TO FOLLOW FOR DISCHARGE.      Readmission Risk              Risk of Unplanned Readmission:  14         Electronically signed by Meche Menchaca RN on 6/13/2021 at 2:27 PM

## 2021-06-13 NOTE — H&P
murmur. LUNGS:  Clear. ABDOMEN:  Soft. EXTREMITIES:  Are in a contracted position at this time. No edema. Muscle wasting. IMPRESSION:  1.  UTI suspected, possibly underlying sepsis. 2.  Lactic acidosis due to hypotension. 3.  Left ear cancer. 4.  Hypothyroidism. 5.  History of hypertension. 6.  BPH requiring _____ insertion of indwelling Blanco catheter. PLAN:  IV fluids with bicarb. Continue Synthroid therapy. N.p.o. until  swallowing evaluation performed. Code status to be discussed with the  son. Maintain hydration and antibiotic therapy.         [facility],     D: 06/13/2021 10:29:16       T: 06/13/2021 11:30:54     [id number]  Job#: [id number]     Doc#: [id number]    CC:

## 2021-06-13 NOTE — PROGRESS NOTES
[facility]   Facility/Department: [address]  Speech Language Pathology    mEi Zavala  2/22/1923  [id number]    Date: 6/13/2021      Speech Therapy attempted to see Emi Zavala on this date for a/an:    Clinical Bedside Swallow Evaluation    Pt was unable to be seen due to:   Nursing deferred:Spoke with RN, Jermaine Jones. Pt too agitated. Jermaine Jones deferred BSE stating, \"Let's wait for 24 hours to see if it will decrease his agitation. \"    RN also stated he was coughing on everything and made him NPO. Re-attempt as able.         Electronically signed by TAMRA Avila on 6/13/21 at 10:48 AM EDT

## 2021-06-14 NOTE — FLOWSHEET NOTE
Assessment complete. Unable to communicate well with pt due to severe hearing loss. He was upset when I changed his linens. I will contact son Ruby Roldan soon in regards to his care. [address] Speech therapy in to see pt.

## 2021-06-14 NOTE — PROGRESS NOTES
[facility]   Facility/Department: [facility]  Speech Language Pathology  Clinical Bedside Swallow Evaluation    NAME:Sky Calixto  : 1923 (80 y.o.)   MRN: [id number]  [id number]  ADMISSION DATE: 2021  PATIENT DIAGNOSIS(ES): Septicemia [facility]) [A41.9]  Chief Complaint   Patient presents with    Fatigue     Patient Active Problem List    Diagnosis Date Noted    Septicemia ([facility] [address] 2021    Encephalopathy 2021    Iron deficiency anemia 2020    Blanco catheter in place 2020    Hyperkalemia 2020    Onychomycosis 2020    Absolute anemia 2020    Difficulty in walking 2020    Muscle weakness (generalized) 2020    Urinary retention     Gross hematuria     Acute renal failure (ARF) ([facility] [address] 2019     Past Medical History:   Diagnosis Date    BPH (benign prostatic hyperplasia)     Cancer ([facility] [address]     skin CA left ear    Hypertension     Hypothyroidism     Urinary retention      Past Surgical History:   Procedure Laterality Date    EAR SURGERY      Left ear removed     No Known Allergies    DATE ONSET: 2021    Date of Evaluation: 2021   Evaluating Therapist: TAMRA Chan    Recommended Diet and Intervention  Diet Solids Recommendation: NPO  Liquid Consistency Recommendation: NPO  Recommended Form of Meds: Via alternative means of nutrition  Recommendations: NPO  Therapeutic Interventions: Mary, Mendelsohn, Thermal gustatory stimulation, Oral care, Effortful swallow, Oral motor exercises, Therapeutic PO trials with SLP, Patient/Family education, Tongue base strengthening, Laryngeal exercises, Pharyngeal exercises    Compensatory Swallowing Strategies       Reason for Referral  Sho Mclean was referred for a bedside swallow evaluation to assess the efficiency of his swallow function, identify signs and symptoms of aspiration and make recommendations regarding safe dietary consistencies, effective compensatory strategies, and safe eating environment. General  Chart Reviewed: Yes  Subjective  Subjective: Pt very hard of hearing. Pt had left ear surgically removed due to cancer. Difficulty hearing out of right ear. Behavior/Cognition: Alert; Cooperative  Respiratory Status: Room air  O2 Device: None (Room air)  Communication Observation: Functional  Follows Directions: Simple (Pt followed very limited commands due to hearing issues.)  Dentition: Poor dental/oral hygeine  Patient Positioning: Upright in bed  Baseline Vocal Quality: Weak  Volitional Swallow: Absent  Prior Dysphagia History: Pt unable to hear questions. Consistencies Administered: Ice Chips;Honey - teaspoon    Current Diet level:  Current Diet : NPO  Current Liquid Diet : NPO    Oral Motor Deficits  Oral/Motor  Oral Motor: [facility]  Labial Strength: Reduced  Labial Sensation: Reduced  Labial Coordination: Reduced  Lingual ROM: Reduced left  Lingual Symmetry: Abnormal symmetry left  Lingual Strength: Reduced  Lingual Sensation: Reduced  Lingual Coordination: Reduced    Oral Phase Dysfunction  Oral Phase  Oral Phase: Exceptions  Oral Phase Dysfunction  Impaired Mastication: All  Decreased Anterior to Posterior Transit: All  Lingual/Palatal Residue: All  Oral Phase  Oral Phase - Comment: Pt presents with severe oral stage dysphagia characterized by general oral motor weakness resulting in impaired mastication, decreased A-P transit, and max lingual residue. Pt was observed to hold ice chip in mouth with no lingual manipulation. SLP required to scoop icechip from mouth. SLP coated 1/2 teaspoon with HTL. Pt was observed to hold bolus in mouth. SLP required to clear oral cavity. SLP performed oral care and stopped PO trials at this time. Indicators of Pharyngeal Phase Dysfunction   Pharyngeal Phase  Pharyngeal Phase: Exceptions  Indicators of Pharyngeal Phase Dysfunction  Absent Swallow:  All  Pharyngeal Phase   Pharyngeal: Suspected severe pharyngeal stage dysphagia characterized by absent swallow. Pt made no move to attempt to swallow ice chips during PO trials. He was unable to follow command to swallow independently. No hyolarngeal movement observed. Impression  Dysphagia Diagnosis: Severe oral stage dysphagia; Severe pharyngeal stage dysphagia  Dysphagia Impression : Pt presents with severe oral stage and suspected severe pharyngeal stage. Dysphagia Outcome Severity Scale: Level 1: Severe dysphagia- NPO. Unable to tolerate any PO safely     Treatment Plan  Requires SLP Intervention: Yes  Duration/Frequency of Treatment: 3-5x/wk  D/C Recommendations: To be determined       Treatment/Goals  Short-term Goals  Timeframe for Short-term Goals: 2 weeks or LOS until goals are met  Goal 1: Pt will tolerate thermal gustatory stimulation in 100% of opportunities to stimulate the swallow and improve swallow onset time. Goal 2: Pt will complete oral motor ROM and strengthening exercises with 70% accuracy, given cues as needed, in order to strengthen lingual/labial/buccal musculature to promote safety and efficiency of oral phase of swallow and decrease risk for pocketing. Goal 3: Pt will complete lingual exercises that promote anterior to posterior propulsion of bolus and improve tongue base retraction with 80% accuracy in order to strengthen the muscles of the swallow to decrease risk of aspiration and to increase ability to safely handle the least restrictive diet level. Goal 4: Pt will participate in PO trials of HTL and puree with SLP and demonstrated no s/s of aspiration in 5/5 trials. Goal 5: If deemed appropriate by treating SLP, pt will participate in formal instrument testing in order to further assess his swallow and determine his least restrictive diet. Long-term Goals  Timeframe for Long-term Goals: 2 weeks or LOS until goals are met  Goal 1: Pt will tolerate the recommended diet level with no s/s of aspiration. Dysphagia Goals:  The patient will tolerate recommended diet without observed clinical signs of aspiration    Prognosis  Prognosis  Prognosis for safe diet advancement: fair  Barriers to reach goals: age;other (comment) (Hearing concerns)  Individuals consulted  Consulted and agree with results and recommendations: Patient;RN Tonya Pickering)    Education  Patient Education: Pt educated on results of BSE. Patient Education Response: Needs reinforcement; No evidence of learning  Safety Devices in place: Yes  Type of devices: Bed alarm in place;Call light within reach    Pain Assessment:  Pre-Treatment  Pain assessment: 0-10  Pain level: unable to rate  Intervention:  Patient demonstrated no s/s of pain. Post-Treatment  Pain assessment: 0-10  Pain level: unable to rate  Intervention:  Patient demonstrated no s/s of pain.          Therapy Time  SLP Individual Minutes  Time In: 9095  Time Out: 7725  Minutes: 20              Signature: Electronically signed by TAMRA Pandya on 6/14/2021 at 10:36 AM

## 2021-06-14 NOTE — PROGRESS NOTES
Medicine Progress Note  *labs and temps better  Assessment:  Sepsis UTI  Dementia  Swallowing issues  Chronic esposito       Plan:continue IV nutrition  May need PEG    Tried to call son about code status    Esposito needs changing  Patient Active Problem List:     Acute renal failure (ARF) ([facility] [address]     Urinary retention     Gross hematuria     Absolute anemia     Difficulty in walking     Muscle weakness (generalized)     Hyperkalemia     Onychomycosis     Iron deficiency anemia     Esposito catheter in place     Encephalopathy     Septicemia ([facility] [address]      Subjective:  Admit Date: 6/12/2021    Interval History: moans no actual communications    Medications:  Scheduled Meds:   levothyroxine  100 mcg Oral Daily    finasteride  5 mg Oral Daily    calcium-vitamin D  1 tablet Oral Daily    cefTRIAXone (ROCEPHIN) IV  1,000 mg Intravenous Q24H    sodium chloride flush  5-40 mL Intravenous 2 times per day    enoxaparin  40 mg Subcutaneous Daily     Continuous Infusions:   CLINIMIX E 4.25/10 2,000 mL (06/13/21 1710)    sodium chloride      sodium bicarbonate infusion 100 mL/hr at 06/14/21 0352       CBC:   Recent Labs     06/12/21  1845 06/14/21  0600   WBC 11.6* 5.5   HGB 9.2* 8.2*    185     CMP:    Recent Labs     06/12/21  1845 06/13/21  1345 06/14/21  0600   * 136 135   K 4.5 4.2 4.1   CL 98 103 99   CO2 24 22 24   BUN 37* 31* 29*   CREATININE 1.02 0.85 0.67*   GLUCOSE 145* 89 119*   CALCIUM 9.3 8.8 8.2*   LABGLOM >60.0 >60.0 >60.0     Troponin:   Recent Labs     06/12/21  1845   TROPONINI 0.063*     BNP: No results for input(s): BNP in the last 72 hours. INR:   Recent Labs     06/12/21  1845   INR 1.2     Lipids:   Recent Labs     06/12/21 1845   LIPASE 14     Liver:   Recent Labs     06/14/21  0600   AST 13   ALT 7   ALKPHOS 74   PROT 6.6   LABALBU 2.8*   BILITOT 0.4     Iron:  No results for input(s): IRONS, FERRITIN in the last 72 hours.     Invalid input(s): LABIRONS  Urinalysis: No results for input(s): UA in the last 72 hours.     Objective:  Vitals: BP (!) 123/109   Pulse (!) 49   Temp 97.5 °F (36.4 °C) (Oral)   Resp 18   Ht 5' 6\" (1.676 m)   Wt 133 lb (60.3 kg)   SpO2 98%   BMI 21.47 kg/m²    Wt Readings from Last 3 Encounters:   06/14/21 133 lb (60.3 kg)   02/16/21 140 lb (63.5 kg)   07/19/20 150 lb (68 kg)      24HR INTAKE/OUTPUT:      Intake/Output Summary (Last 24 hours) at 6/14/2021 0818  Last data filed at 6/14/2021 0439  Gross per 24 hour   Intake 0 ml   Output 2780 ml   Net -2780 ml       General: alert, in no apparent distress  HEENT: normocephalic, atraumatic, anicteric  Neck: supple, no mass  Lungs: non-labored respirations, clear to auscultation bilaterally  Heart: regular rate and rhythm, no murmurs or rubs  Abdomen: soft, non-tender, non-distended  Ext: no cyanosis, no peripheral edema  cachexia  Neuro: alert and oriented, no gross abnormalities  Psych: normal mood and affect  Skin: no rash      Electronically signed by Kimberly Chow DO, MD

## 2021-06-14 NOTE — PROGRESS NOTES
Physical Therapy Med Surg Initial Assessment  [facility]  [facility]       NAME: Faith Wolff  : 1923 (80 y.o.)  MRN: [id number]  CODE STATUS: Full Code    Date of Service: 2021    Patient Diagnosis(es): Septicemia [facility]) [A41.9]   Chief Complaint   Patient presents with    Fatigue     Patient Active Problem List    Diagnosis Date Noted    Septicemia ([facility] [address] 2021    Encephalopathy 2021    Iron deficiency anemia 2020    Blanco catheter in place 2020    Hyperkalemia 2020    Onychomycosis 2020    Absolute anemia 2020    Difficulty in walking 2020    Muscle weakness (generalized) 2020    Urinary retention     Gross hematuria     Acute renal failure (ARF) ([facility] [address] 2019        Past Medical History:   Diagnosis Date    BPH (benign prostatic hyperplasia)     Cancer ([facility] [address]     skin CA left ear    Hypertension     Hypothyroidism     Urinary retention      Past Surgical History:   Procedure Laterality Date    EAR SURGERY      Left ear removed       Chart Reviewed: Yes  Patient assessed for rehabilitation services?: Yes  Family / Caregiver Present: No  General Comment  Comments: Pt resting in bed - agreeable to PT evaluation    Restrictions:  Restrictions/Precautions: Fall Risk     SUBJECTIVE: Subjective: \"I'm almost 8 years old. \"    Pain  Pre Treatment Pain Screening  Comments / Details: denies    Post Treatment Pain Screening:   Pain Assessment  Pain Assessment:  (denies)    Prior Level of Function:  Social/Functional History  Lives With: Son  Type of Home: House  Home Layout: Two level, Performs ADL's on one level, Able to Live on Main level with bedroom/bathroom  Home Equipment: Rolling walker  Ambulation Assistance: Needs assistance (with [employer])  Transfer Assistance: Needs assistance  Additional Comments: Difficult obtaining PLOF and home set up d/t severity of pt's hearing.  Pt able to state that he never gets OOB alone - always has his son, Adrian Gone there with him. Reports that he has assist to bath and dress, but that he predominantly is sedentary at home. OBJECTIVE:   Vision: Impaired  Vision Exceptions: Wears glasses at all times  Hearing: Exceptions to [facility]  Hearing Exceptions: Hard of hearing/hearing concerns (Pt had left ear surgically removed due to cancer. Demonstrated max difficlty hearing from his right ear. Does best with deep voices.)    Cognition:  Orientation Level: Oriented to person (unable to assess further orientation d/t hearing difficulties and decreased attention)  Follows Commands: Within Functional Limits    Observation/Palpation  Observation: Pt resting in bed in no acute distress. Pt biases in hip, knee and trunk flexion with HOB in bed. Frequently fidgeting with fingers. Decreased attention overall requiring cues to attend. Repeats himself frequently. ROM:  RLE PROM: WFL  LLE PROM: WFL    Strength:  Strength Other  Other: Unable to follow formal MMT however demonstrates partial AROM all joints of B LEs. Generally 3-/5 throughout. Neuro:  Balance  Sitting - Static: Good  Sitting - Dynamic: Good;-  Standing - Static: Fair     Motor Control  Gross Motor?: WFL (Difficulty with verbal instruction for mobility.)  Sensation  Overall Sensation Status:  (Not formally addressed)    Bed mobility  Rolling to Right: Minimal assistance  Supine to Sit: Minimal assistance  Sit to Supine: Stand by assistance  Scooting: Contact guard assistance; Moderate assistance  Comment: ModA to complete certain aspects of mobiliy d/t decreased attention/hearing resulting in poor ability to follow commands. Transfers  Sit to Stand: Contact guard assistance  Stand to sit: Stand by assistance;Contact guard assistance  Comment: Pt pulls from walker for support and sits without warning. CGA for safety.     Ambulation  Ambulation?: Yes  Ambulation 1  Device: Rolling Walker  Assistance: Contact guard assistance  Quality of Gait: Stands X 5sec before returning to sit  Distance: Standing only  Comments: Pt wishing to return to bed. Activity Tolerance  Activity Tolerance: Patient limited by cognitive status          PT Education  PT Education: Goals;PT Role;Plan of Care    ASSESSMENT:   Body structures, Functions, Activity limitations: Decreased functional mobility ; Decreased safe awareness;Decreased balance;Decreased coordination;Decreased cognition;Decreased endurance;Decreased strength;Decreased ADL status  Decision Making: High Complexity  History: High  Exam: Med  Clinical Presentation: Med    Prognosis: Good  Barriers to Learning: cog/[place]    DISCHARGE RECOMMENDATIONS:  Discharge Recommendations: Continue to assess pending progress, Patient would benefit from continued therapy after discharge    Assessment: Continued PT indicated to prevent further decline in mobility and facilitate DC at highest level of indep and safety. REQUIRES PT FOLLOW UP: Yes      PLAN OF CARE:  Plan  Times per week: 3-6  Current Treatment Recommendations: Strengthening, Balance Training, Transfer Training, Functional Mobility Training, Neuromuscular Re-education, Gait Training, Stair training, Endurance Training, Home Exercise Program, Safety Education & Training, Equipment Evaluation, Education, & procurement, Patient/Caregiver Education & Training  Safety Devices  Type of devices:  All fall risk precautions in place    Goals:  Long term goals  Long term goal 1: Pt to complete bed mobility with supervision  Long term goal 2: Pt to complete transfers with SBA  Long term goal 3: Pt to ambulate 50ft with [employer] and CGA    [facility] (6 CLICK) BASIC MOBILITY  AM-PAC Inpatient Mobility Raw Score : 17     Therapy Time:   Individual   Time In 1017   Time Out 1038   Minutes [address] [facility], [address] [facility], 06/14/21 at 11:01 AM         Definitions for assistance levels  Independent = pt does not require any physical supervision or assistance from another person for activity completion. Device may be needed.   Stand by assistance = pt requires verbal cues or instructions from another person, close to but not touching, to perform the activity  Minimal assistance= pt performs 75% or more of the activity; assistance is required to complete the activity  Moderate assistance= pt performs 50% of the activity; assistance is required to complete the activity  Maximal assistance = pt performs 25% of the activity; assistance is required to complete the activity  Dependent = pt requires total physical assistance to accomplish the task

## 2021-06-14 NOTE — PLAN OF CARE
Nutrition Problem #1: Inadequate oral intake  Intervention: Food and/or Nutrient Delivery: Continue NPO, Start Tube Feeding, Discontinue Parenteral Nutrition (Continue NPO at this time. PPN does not appear indicated, as GI tract remains functional. Recommend placement of enteral feeding device, if in alingment with goals of care. Recommend Standard wtih Fiber TF ( Jevity 1.5) . 25 ml/hr x 23 hrs.  Goal rate of)  Nutritional Goals: transition to EN, nutrition support to deliver kcals/protien within range of estimated needs

## 2021-06-14 NOTE — PROGRESS NOTES
Physician Progress Note      Jacqueline Quinteros  [facility] #:                  [id number]  :                       1923  ADMIT DATE:       2021 6:22 PM  [address] [place] DATE:  RESPONDING  PROVIDER #:        Anuj Romo DO        QUERY TEXT:    Type of Encephalopathy: Please provide further specificity, if known. Clinical indicators include: sepsis, encephalopathy, epticemia  Options provided:  -- Anoxic/hypoxic encephalopathy  -- Metabolic encephalopathy  -- Toxic encephalopathy  -- Hepatic encephalopathy  -- Hypertensive encephalopathy  -- Other - I will add my own diagnosis  -- Disagree - Not applicable / Not valid  -- Disagree - Clinically Unable to determine / Unknown        PROVIDER RESPONSE TEXT:    The patient has metabolic encephalopathy.       Electronically signed by:  Anuj Romo DO 2021 1:30 PM

## 2021-06-14 NOTE — PROGRESS NOTES
Comprehensive Nutrition Assessment    Type and Reason for Visit:  Initial, Positive Nutrition Screen (chew/swallow- PN)    Nutrition Recommendations/Plan:  Continue NPO   PPN does not appear indicated, as GI tract remains functional.   Recommend placement of enteral feeding device, if in alingment with goals of care. Recommend Standard wtih Fiber TF ( Jevity 1.5) @ 25 ml/hr x 23 hrs. Hold TF 30 minutes before and after synthroid  Goal rate of 50 ml/hr x 23 hrs recommended. D/c PPN once TF started, begin water flush 100 ml, every 4 hrs      Nutrition Assessment:  Unable to determine nutritional status PTAm difficult to determine adipose/muscle losses related to nutrtion related deficits versus age associated changes. GI tract appears functional, recommend NG placement for EN as preferred route of nutrtion suppport, if in alignement with goals of care    Malnutrition Assessment:  Malnutrition Status: At risk for malnutrition (Comment)    Context:  Chronic Illness     Findings of the 6 clinical characteristics of malnutrition:  Energy Intake:  Unable to assess  Weight Loss:  No significant weight loss     Body Fat Loss:  1 - Mild body fat loss Buccal region, Orbital   Muscle Mass Loss:  1 - Mild muscle mass loss Clavicles (pectoralis & deltoids), Temples (temporalis), Thigh (quadraceps)  Fluid Accumulation:  No significant fluid accumulation     Strength:  Not Performed    Estimated Daily Nutrient Needs:  Energy (kcal):  2372-4377 kcals @ 25-28 kcal/kg; Weight Used for Energy Requirements:  Current (60 kg)     Protein (g):  72-78 g protein @ 1.2-1.3 g/kg; Weight Used for Protein Requirements:  Current (60 kg)        Fluid (ml/day):  ~8081-2554; Method Used for Fluid Requirements:  1 ml/kcal      Nutrition Related Findings:  Pt admitted with septicemia. confused, from home with family, failed BSE ( 6/14) , PPN started 6/13, but does not appear indciated.  \"PMHx of left ear CA with surgical removal, HTN, hypothyroidism, indwelling esposito CA d./t BPH & urinary retention presents to the emergency department with confusion. Sons called ambulance today due to increased weakness, lethargy, confusion which started yesterday. \", Meds include synthroid, labs noted. As ordered PN delivers ~80% estimated energy/> 100% estiamted protein needs      Wounds:  None       Current Nutrition Therapies:    Diet NPO  Current Parenteral Nutrition Orders:  · Type and Formula: 2-in-1 Peripheral   · Lipids: None  · Duration: Continuous  · Rate/Volume: 100 ml/hr = 2400 ml  · Current PN Order Provides: 1224 kcals, 102 g protein  · Goal PN Orders Provides: 1224 kcals, 102 g protein    Anthropometric Measures:  · Height: 5' 6\" (167.6 cm)  · Current Body Weight: 133 lb (60.3 kg)   · Admission Body Weight: 129 lb (58.5 kg)    · Usual Body Weight: 140 lb (63.5 kg) (est, no actual wts in EMR)     · Ideal Body Weight: 142 lbs; % Ideal Body Weight   94%  · BMI: 21.5   · BMI Categories: Underweight (BMI less than 22) age over 72       Nutrition Diagnosis:   · Inadequate oral intake related to swallowing difficulty as evidenced by NPO or clear liquid status due to medical condition, swallow study results    Nutrition Interventions:   Food and/or Nutrient Delivery:  Continue NPO, Start Tube Feeding, Discontinue Parenteral Nutrition (Continue NPO at this time. PPN does not appear indicated, as GI tract remains functional. Recommend placement of enteral feeding device, if in alingment with goals of care. Recommend Standard wtih Fiber TF ( Jevity 1.5) . 25 ml/hr x 23 hrs.  Goal rate of)  Nutrition Education/Counseling:  Education not indicated   Coordination of Nutrition Care:  Continue to monitor while inpatient    Goals:  transition to EN, nutrition support to deliver kcals/protien within range of estimated needs       Nutrition Monitoring and Evaluation:     Food/Nutrient Intake Outcomes:  Diet Advancement/Tolerance, Food and Nutrient Intake, Parenteral Nutrition Intake/Tolerance, Enteral Nutrition Intake/Tolerance  Physical Signs/Symptoms Outcomes:  Biochemical Data, Chewing or Swallowing, GI Status, Weight     Discharge Planning:     Too soon to determine     Electronically signed by Samuel Bonilla RD, LD on 6/14/21 at 2:03 PM EDT

## 2021-06-14 NOTE — CARE COORDINATION
[facility] SUPER[facility], NOTIFIED OF REFERRAL. 1200   PER NURSING PT FAILED BSE, FAMILY GOALS OF CARE, ? SNF VS HOSPICE.

## 2021-06-14 NOTE — PROGRESS NOTES
Difficult obtaining PLOF and home set up d/t severity of pt's hearing. Pt able to state that he never gets OOB alone - always has his son, Bhumika Mezas there with him. Reports that he has assist to bath and dress, but that he predominantly is sedentary at home. OBJECTIVE:     Orientation Status:  Orientation  Overall Orientation Status: Within Functional Limits    Observation:  Observation/Palpation  Observation: Pt resting in bed in no acute distress. Pt biases in hip, knee and trunk flexion with HOB in bed. Frequently fidgeting with fingers. Decreased attention overall requiring cues to attend. Repeats himself frequently. Cognition Status:  Cognition  Overall Cognitive Status: Exceptions  Arousal/Alertness: Delayed responses to stimuli, Inconsistent responses to stimuli  Following Commands: Follows one step commands with repetition, Follows one step commands with increased time, Inconsistently follows commands  Attention Span: Difficulty attending to directions  Memory: Decreased short term memory, Decreased long term memory  Safety Judgement: Decreased awareness of need for assistance, Decreased awareness of need for safety  Problem Solving: Assistance required to identify errors made, Assistance required to correct errors made, Decreased awareness of errors  Insights: Decreased awareness of deficits  Initiation: Requires cues for some  Sequencing: Requires cues for some    Perception Status:  Perception  Overall Perceptual Status: WFL (pt reports no concerns)    Sensation Status:  Sensation  Overall Sensation Status:  (Not formally addressed)    Vision and Hearing Status:  Vision  Vision: Impaired  Vision Exceptions: Wears glasses at all times  Hearing  Hearing: Exceptions to [facility]  Hearing Exceptions: Hard of hearing/hearing concerns (Pt had left ear surgically removed due to cancer. Demonstrated max difficlty hearing from his right ear.  Does best with deep voices.)     ROM:   LUE AROM (degrees)  LUE AROM : WFL  Left Hand AROM (degrees)  Left Hand AROM: WFL  RUE AROM (degrees)  RUE AROM : WFL  Right Hand AROM (degrees)  Right Hand AROM: WFL    Strength:  LUE Strength  L Hand General: 4-/5  RUE Strength  R Hand General: 4-/5    Coordination, Tone, Quality of Movement: Tone RUE  RUE Tone: Normotonic  Tone LUE  LUE Tone: Normotonic  Coordination  Movements Are Fluid And Coordinated: No  Coordination and Movement description: Decreased speed, Decreased accuracy      ADL Status:  ADL  Feeding: Moderate assistance  Grooming: Moderate assistance  UE Bathing: Maximum assistance  LE Bathing: Maximum assistance  UE Dressing: Maximum assistance  LE Dressing: Maximum assistance  Toileting: Moderate assistance  Additional Comments: Pt had difficulty following directions at this time due to decreased cognition and hearing loss, he requires increased time and prompting to participate.  Simulated at [employer] Transfers Comments: anticipate CGA       Therapy key for assistance levels -   Independent = Pt. is able to perform task with no assistance but may require a device   Stand by assistance = Pt. does not perform task at an independent level but does not need physical assistance, requires verbal cues  Minimal, Moderate, Maximal Assistance = Pt. requires physical assistance (25%, 50%, 75% assist from helper) for task but is able to actively participate in task   Dependent = Pt. requires total assistance with task and is not able to actively participate with task completion     Functional Mobility:     Transfers  Sit to stand: Contact guard assistance  Stand to sit: Contact guard assistance    Bed Mobility  Bed mobility  Supine to Sit: Minimal assistance  Sit to Supine: Stand by assistance  Scooting: Contact guard assistance, Moderate assistance  Comment: Increased assistance d/t decreased attention/hearing resulting in porr ability to follow commands    Seated and Standing Balance:  Balance  Sitting Balance: fasteners/self-care containers in a timely manner  - Improve B UE Function (AROM, strength, motor control, tone normalization) to complete ADLs as projected. - Improve BUE strength and endurance to mod I in order to participate in self-care activities as projected.   - Sequence self-care tasks with supervision    Patient Goal:      Discussed and agreed upon: Yes Comments:     Therapy Time:   OT Individual Minutes  Time In: 1022  Time Out: 1038  Minutes: 16    Eval: 16 minutes     Electronically signed by:    Comfort Dela Cruz OT, OTR/L  6/14/2021, 10:58 AM

## 2021-06-15 NOTE — PROGRESS NOTES
Physical Therapy Med Surg Daily Treatment Note  [facility]  [facility]       NAME: Torito Moss  : 1923 (80 y.o.)  MRN: [id number]  CODE STATUS: Full Code    Date of Service: 6/15/2021    Patient Diagnosis(es): Septicemia [facility]) [A41.9]   Chief Complaint   Patient presents with    Fatigue     Patient Active Problem List    Diagnosis Date Noted    Septicemia ([facility] [address] 2021    Encephalopathy 2021    Iron deficiency anemia 2020    Blanco catheter in place 2020    Hyperkalemia 2020    Onychomycosis 2020    Absolute anemia 2020    Difficulty in walking 2020    Muscle weakness (generalized) 2020    Urinary retention     Gross hematuria     Acute renal failure (ARF) ([facility] [address] 2019        Past Medical History:   Diagnosis Date    BPH (benign prostatic hyperplasia)     Cancer ([facility] [address]     skin CA left ear    Hypertension     Hypothyroidism     Urinary retention      Past Surgical History:   Procedure Laterality Date    EAR SURGERY      Left ear removed       Restrictions  Restrictions/Precautions: Fall Risk    SUBJECTIVE   General  Chart Reviewed: Yes  Family / Caregiver Present: Yes  Subjective  Subjective: \"I can try. \"    Pre-Session Pain Report  Pre Treatment Pain Screening  Pain at present: 0  Scale Used: Numeric Score  Intervention List: Patient able to continue with treatment  Pain Screening  Patient Currently in Pain: No       Post-Session Pain Report  Pain Assessment  Pain Assessment: 0-10  Pain Level: 0         OBJECTIVE        Bed mobility  Rolling to Right: Contact guard assistance  Supine to Sit: Contact guard assistance  Sit to Supine: Contact guard assistance  Comment: increased cues needed d/t HH. pt with good ability once following cues. Transfers  Sit to Stand: Contact guard assistance  Stand to sit: Contact guard assistance  Comment: Pt pulls from walker for support and sits without warning.  CGA

## 2021-06-15 NOTE — DISCHARGE INSTR - COC
Continuity of Care Form    Patient Name: Marian Mccauley   :  1923  MRN:  [id number]    [facility] date:  2021  Discharge date: 21      Code Status Order: Full Code   Advance Directives:     Admitting Physician:  Bambi Vargas DO  PCP: Bambi Vargas DO    Discharging Nurse: Emily Ragland Unit/Room#: J166/F958-87  Discharging Unit Phone Number: [phone]    Emergency Contact:   Extended Emergency Contact Information  Primary Emergency Contact: Narendra Garcia Phone: [phone]  Mobile Phone: [phone]  Relation: None  Secondary Emergency Contact: [facility] Phone: [phone]  Mobile Phone: [phone]  Relation: Child    Past Surgical History:  Past Surgical History:   Procedure Laterality Date    EAR SURGERY      Left ear removed       Immunization History:   Immunization History   Administered Date(s) Administered    Influenza, Quadv, IM, PF (6 mo and older Fluzone, Flulaval, Fluarix, and 3 yrs and older Afluria) 2020       Active Problems:  Patient Active Problem List   Diagnosis Code    Acute renal failure (ARF) ([facility] [address] N17.9    Urinary retention R33.9    Gross hematuria R31.0    Absolute anemia D64.9    Difficulty in walking R26.2    Muscle weakness (generalized) M62.81    Hyperkalemia E87.5    Onychomycosis B35.1    Iron deficiency anemia D50.9    Blanco catheter in place Z97.8    Encephalopathy G93.40    Septicemia (HCC) A41.9       Isolation/Infection:   Isolation          No Isolation        Patient Infection Status     None to display          Nurse Assessment:  Last Vital Signs: /66   Pulse (!) 43   Temp 98.2 °F (36.8 °C) (Oral)   Resp 18   Ht 5' 6\" (1.676 m)   Wt 137 lb (62.1 kg)   SpO2 98%   BMI 22.11 kg/m²     Last documented pain score (0-10 scale): Pain Level: 0  Last Weight:   Wt Readings from Last 1 Encounters:   06/15/21 137 lb (62.1 kg)     Mental Status:  oriented and alert    IV Access:  - None    Nursing Mobility/ADLs:  Walking   Assisted  Transfer  Assisted  Bathing  Dependent  Dressing  Dependent  Toileting  Dependent  Feeding  Dependent  Med Admin  Dependent  Med Delivery   whole    Wound Care Documentation and Therapy:        Elimination:  Continence:   · Bowel: Yes  · Bladder: No  Urinary Catheter: lst changed 6/15/21  Colostomy/Ileostomy/Ileal Conduit: {YES / RM:86364}       Date of Last BM:     Intake/Output Summary (Last 24 hours) at 6/15/2021 1103  Last data filed at 6/15/2021 0923  Gross per 24 hour   Intake 0 ml   Output 5200 ml   Net -5200 ml     I/O last 3 completed shifts: In: 0   Out: 3400 [Urine:3400]    Safety Concerns: At Risk for Falls    Impairments/Disabilities:      Vision and Hearing    Nutrition Therapy:  Current Nutrition Therapy:   - Oral Diet:  Dysphagia 1 pureed    Routes of Feeding: Oral  Liquids: Thin Liquids  Daily Fluid Restriction: no  Last Modified Barium Swallow with Video (Video Swallowing Test): done on ***/***    Treatments at the Time of Hospital Discharge:   Respiratory Treatments: ***  Oxygen Therapy:  is not on home oxygen therapy.   Ventilator:    - No ventilator support    Rehab Therapies: Physical Therapy and Occupational Therapy  Weight Bearing Status/Restrictions: No weight bearing restirctions  Other Medical Equipment (for information only, NOT a DME order):  {EQUIPMENT:470315062}  Other Treatments: ***    Patient's personal belongings (please select all that are sent with patient):  Luisa    RN SIGNATURE:  Electronically signed by Maisha Montgomery RN on 6/18/21 at 10:36 AM EDT    CASE MANAGEMENT/SOCIAL WORK SECTION    Inpatient Status Date: ***    Readmission Risk Assessment Score:  Readmission Risk              Risk of Unplanned Readmission:  17           Discharging to Facility/ Agency   · Name: [facility]  · Address:  · Phone:[phone]  · Fax:    Dialysis Facility (if applicable)   · Name:  · Address:  · Dialysis Schedule:  · Phone:  · Fax:    Case Manager/ signature: Electronically signed by HORACIO Perea on 6/15/21 at 11:03 AM EDT      PHYSICIAN SECTION    Prognosis: {Prognosis:9529028463}    Condition at Discharge: [address] Patient Condition:450533546}    Rehab Potential (if transferring to Rehab): {Prognosis:5907502690}    Recommended Labs or Other Treatments After Discharge: ***    Physician Certification: I certify the above information and transfer of Emely Kim  is necessary for the continuing treatment of the diagnosis listed and that he requires {Admit to Appropriate Level of Care:18600} for {GREATER/LESS:999467767} 30 days.      Update Admission H&P: {CHP DME Changes in OCGTJ:527464702}    PHYSICIAN SIGNATURE:  {Esignature:801890729}

## 2021-06-15 NOTE — CARE COORDINATION
The LSW talked to the supervisor at [facility]. Mendoza Pandya [facility] is reviewing the referral. The patient's RN asked the LSW to talk to the patient's sons. Electronically signed by Lark Sandhoff, LSW on 6/15/21 at 10:57 AM EDT    The patient's sons are considering [facility] or possibly another SNF. The patient's sons would like the LSW to call [facility] to see if they have any beds available. The LSW left a message for Gerry admissions at [facility]. Electronically signed by Lark Sandhoff, LSW on 6/15/21 at 10:58 AM EDT    Per Gerry admissions at [facility], [facility] does not take the patient's secondary insurance. The patient's sons state PT mentioned the patient would do well at [facility]. [facility] will review the referral.  The LSW called and updated the supervisor at [facility]. Electronically signed by Lark Sandhoff, LSW on 6/15/21 at 11:33 AM EDT    Mendoza Pandya [facility] remains reviewing the referral. Alis Britt, called the LSW and states the patient's secondary insurance would be able to pay the co insurance at [facility]. The LSW updated the patient's son, Christina Hamm. Christina Hamm states the patient has not had the covid vaccine.  Electronically signed by Rocael Kim on 6/15/21 at 11:58 AM EDT

## 2021-06-15 NOTE — PROGRESS NOTES
consecutive sips via straw and produced a delayed cough in response. SLP implemented straw pinch strategy to limit bolus size. Patient attempted to drink thin liquid via cup, however left sided weakness prevented the patient from forming an adequate labial seal around the cup, resulting in anterior bolus loss. Goal 5: If deemed appropriate by treating SLP, pt will participate in formal instrument testing in order to further assess his swallow and determine his least restrictive diet. Not recommended at this time. Will continue to assess for need for instrumental swallowing procedure. Recommend diet of puree/thin liquids, 1:1, utilizing straw pinch strategy with liquids to control bolus size  Discussed with RN Marla Murray. Updated goals for POC:  1. Patient will tolerate the recommended diet level without overt s/s of aspiration. 2. Patient will tolerated 5/5 trials of minced & moist solids with adequate mastication and oral clearance of bolus in all opportunities independently to decrease risk of aspiration. 3. Educate patient's family/caregivers regarding swallow function and recommended swallowing strategies to decrease patient risk of aspiration. Treatment/Activity Tolerance:  Patient tolerated treatment well    Plan: Alter current POC (see above)    Pain Assessment:  Pre-Treatment  Pain assessment: 0-10  Pain level: 0  Intervention:  Patient denies pain. Post-Treatment  Pain assessment: 0-10  Pain level: 0  Intervention:  Patient denies pain. Patient/Caregiver Education:  Patient educated on session and progression towards goals. Education needs reinforcement.     Safety Devices:  Bed alarm in place, Call light within reach and Avasys      Therapy Time  Time in: 95 20 92  Time out: [address] [place]  Total Minutes: 13    Signature: Electronically signed by TAMRA Napoles on 6/15/2021 at 10:09 AM

## 2021-06-16 NOTE — CARE COORDINATION
The LSW talked to the supervisor at [facility].   [facility]. Electronically signed by HORACIO Squires on 6/16/21 at 10:18 AM EDT

## 2021-06-16 NOTE — PROGRESS NOTES
Comprehensive Nutrition Assessment    Type and Reason for Visit:  Reassess    Nutrition Recommendations/Plan:   Continue Diet Dysphagia pureed. Start fortified pudding at breakfast, frozen ONS at lunch. Recommend discontinue Clinimix and consider NG placement for EN if po intake remains poor    Nutrition Assessment:  Unable to determine nutritional status PTA,  difficult to determine adipose/muscle losses related to nutrtion related deficits versus age associated changes. PO diet started (6/15) after SLT treatments. Clinimix continues to infuse @ 100 ml/hr along with Sodium bicarbonate infusion @ 100 ml/hr. Fair to poor po intake today per nursing. Recommend discontinue Clinimix and consider  NG placement for EN as preferred route of nutrtion suppport if po intake remains poor and in alignement with goals of care    Malnutrition Assessment:  Malnutrition Status: At risk for malnutrition (Comment)    Context:  Chronic Illness     Findings of the 6 clinical characteristics of malnutrition:  Energy Intake:  Unable to assess  Weight Loss:  No significant weight loss     Body Fat Loss:  1 - Mild body fat loss Buccal region, Orbital   Muscle Mass Loss:  1 - Mild muscle mass loss Clavicles (pectoralis & deltoids), Temples (temporalis), Thigh (quadraceps)  Fluid Accumulation:  No significant fluid accumulation     Strength:  Not Performed    Estimated Daily Nutrient Needs:  Energy (kcal):  7672-6280 kcals @ 25-28 kcal/kg; Weight Used for Energy Requirements:  Current (60 kg)     Protein (g):  72-78 g protein @ 1.2-1.3 g/kg; Weight Used for Protein Requirements:  Current (60 kg)        Fluid (ml/day):  ~3235-1376; Method Used for Fluid Requirements:  1 ml/kcal      Nutrition Related Findings:  Pt admitted with septicemia. confused, from home with family, failed BSE ( 6/14) , PPN started 6/13, but does not appear indciated. PO diet started (6/15) after SLT treatments with fair-poor intake per nursing. \"PMHx of left ear CA with surgical removal, HTN, hypothyroidism, indwelling esposito CA d./t BPH & urinary retention. Pt remains confused. Meds include synthroid, labs noted. As ordered PN delivers ~80% estimated energy/> 100% estiamted protein needs      Wounds:  None       Current Nutrition Therapies:    ADULT DIET; Dysphagia - Pureed (26-50%)  Current Parenteral Nutrition Orders:  · Type and Formula: 2-in-1 Peripheral   · Lipids: None  · Duration: Continuous  · Rate/Volume: 100 ml/hr = 2400 ml  · Current PN Order Provides: 1224 kcals, 102 g protein  · Goal PN Orders Provides: 1224 kcals, 102 g protein    Anthropometric Measures:  · Height: 5' 6\" (167.6 cm)  · Current Body Weight: 133 lb (60.3 kg)   · Admission Body Weight: 129 lb (58.5 kg)    · Usual Body Weight: 140 lb (63.5 kg) (est, no actual wts in EMR)     · Ideal Body Weight: 142 lbs; % Ideal Body Weight  92%   · BMI: 21.5  · BMI Categories: Underweight (BMI less than 22) age over 72       Nutrition Diagnosis:   · Swallowing difficulty related to cognitive or neurological impairment as evidenced by swallow study results    · Inadequate oral intake related to cognitive or neurological impairment, swallowing difficulty as evidenced by intake 0-25%, intake 26-50%    Nutrition Interventions:   Food and/or Nutrient Delivery:  Continue Current Diet, Start Oral Nutrition Supplement (Continue Diet Dysphagia pureed. Start fortified pudding at breakfast, frozen ONS at lunch. Recommend discontinue Clinimix and consider NG placement for EN if po intake remains poor)  Nutrition Education/Counseling:  Education not indicated   Coordination of Nutrition Care:  Continue to monitor while inpatient    Goals:  New Goals with po diet: PO intake > 50% of meals and supplements. EN vs PPN.  stable wt ~ 135 lb       Nutrition Monitoring and Evaluation:   Food/Nutrient Intake Outcomes:  Food and Nutrient Intake, Supplement Intake, Parenteral Nutrition Intake/Tolerance, IVF

## 2021-06-16 NOTE — PROGRESS NOTES
Physical Therapy Missed Treatment   Facility/Department: [facility] MED SURG W181/K840-59    NAME: Bennie Mendez    : 1923 (80 y.o.)  MRN: [id number]    Account: [de-identified]  Gender: male        [x] Patient Declines PT Treatment: patient sleeping upon arrival. Awakes easily. Adamantly refuses. Provided encouragement. Continues to decline. Will attempt PT treatment again at earliest convenience.         Electronically signed by Tesfaye Tinajero PTA on 21 at 1:50 PM EDT

## 2021-06-16 NOTE — PLAN OF CARE
Nutrition Problem #1: Swallowing difficulty  Intervention: Food and/or Nutrient Delivery: Continue Current Diet, Start Oral Nutrition Supplement (Continue Diet Dysphagia pureed. Start fortified pudding at breakfast, frozen ONS at lunch. Recommend discontinue Clinimix and consider NG placement for EN if po intake remains poor)  Nutritional Goals: New Goals with po diet: PO intake > 50% of meals and supplements. EN vs PPN.  stable wt ~ 135 lb

## 2021-06-16 NOTE — PROGRESS NOTES
[facility]   Facility/Department: [address]  Speech Language Pathology    Joan Gonsalez  2/22/1923  M856/R653-50    Date: 6/16/2021      Speech Therapy attempted to see Joan Gonsalez on this date for a/an:    Treatment    Pt was unable to be seen due to:   Patient refused - Pt asleep upon SLP arrival. Following encouragement and visual stimulation, pt closed eyes and put hat over face. Pt did not respond to any att for PO trials this date.          Electronically signed by TAMRA Bryson on 6/16/21 at 3:45 PM EDT

## 2021-06-17 NOTE — CARE COORDINATION
The LSW talked to the supervisor at [facility]. [facility] remains following. [facility] can take a patient on clinimix. Electronically signed by HORACIO Michelle on 6/17/21 at 12:24 PM EDT    Per the supervisor at [facility], the [place] has been [place] [facility] The patient will need a covid test prior to discharge.  Electronically signed by Jesse Winkler on 6/17/21 at 1:47 PM EDT

## 2021-06-17 NOTE — PROGRESS NOTES
Medical Progress Note    Assessment:  Failure to thrive  Dysphagia  Dementia  DJD   Poor nutrition  Chronic esposito        Plan: going to [facility]    Patient Active Problem List:     Acute renal failure (ARF) ([facility] [address]     Urinary retention     Gross hematuria     Absolute anemia     Difficulty in walking     Muscle weakness (generalized)     Hyperkalemia     Onychomycosis     Iron deficiency anemia     Esposito catheter in place     Encephalopathy     Septicemia ([facility] [address]      Subjective:  Admit Date: 6/12/2021    Interval History: more alert today no choking    Medications:  Scheduled Meds:   levothyroxine  100 mcg Oral Daily    finasteride  5 mg Oral Daily    calcium-vitamin D  1 tablet Oral Daily    cefTRIAXone (ROCEPHIN) IV  1,000 mg Intravenous Q24H    sodium chloride flush  5-40 mL Intravenous 2 times per day    enoxaparin  40 mg Subcutaneous Daily     Continuous Infusions:   CLINIMIX E 4.25/10 2,000 mL (06/16/21 1046)    sodium chloride      sodium bicarbonate infusion 100 mL/hr at 06/17/21 0847       CBC: No results for input(s): WBC, HGB, PLT in the last 72 hours. CMP:  No results for input(s): NA, K, CL, CO2, BUN, CREATININE, GLUCOSE, CALCIUM, LABGLOM in the last 72 hours. Troponin: No results for input(s): TROPONINI in the last 72 hours. BNP: No results for input(s): BNP in the last 72 hours. INR: No results for input(s): INR in the last 72 hours. Lipids: No results for input(s): CHOL, LDLDIRECT, TRIG, HDL, AMYLASE, LIPASE in the last 72 hours. Liver: No results for input(s): AST, ALT, ALKPHOS, PROT, LABALBU, BILITOT in the last 72 hours. Invalid input(s): BILDIR  Iron:  No results for input(s): IRONS, FERRITIN in the last 72 hours. Invalid input(s): LABIRONS  Urinalysis: No results for input(s): UA in the last 72 hours.     Objective:  Vitals: BP (!) 148/82   Pulse 62   Temp 98.2 °F (36.8 °C) (Axillary)   Resp 17   Ht 5' 6\" (1.676 m)   Wt 131 lb 14.4 oz (59.8 kg)   SpO2 93%   BMI 21.29 kg/m²

## 2021-06-17 NOTE — PROGRESS NOTES
Physical Therapy Med Surg Daily Treatment Note  Facility/Department[facility]  [facility]       NAME: Iris Baptiste  : 1923 (80 y.o.)  MRN: [id number]  CODE STATUS: Full Code    Date of Service: 2021    Patient Diagnosis(es): Septicemia [facility]) [A41.9]   Chief Complaint   Patient presents with    Fatigue     Patient Active Problem List    Diagnosis Date Noted    Septicemia ([facility] [address] 2021    Encephalopathy 2021    Iron deficiency anemia 2020    Blanco catheter in place 2020    Hyperkalemia 2020    Onychomycosis 2020    Absolute anemia 2020    Difficulty in walking 2020    Muscle weakness (generalized) 2020    Urinary retention     Gross hematuria     Acute renal failure (ARF) ([facility] [address] 2019        Past Medical History:   Diagnosis Date    BPH (benign prostatic hyperplasia)     Cancer ([facility] [address]     skin CA left ear    Hypertension     Hypothyroidism     Urinary retention      Past Surgical History:   Procedure Laterality Date    EAR SURGERY      Left ear removed       Restrictions  Restrictions/Precautions: Fall Risk    SUBJECTIVE   General  Chart Reviewed: Yes  Family / Caregiver Present: No  Subjective  Subjective: \"I'm 8 years old, I don't know what you expect from me. \"    Pre-Session Pain Report  Pre Treatment Pain Screening  Pain at present: 0  Intervention List: Patient able to continue with treatment  Pain Screening  Patient Currently in Pain: Denies       Post-Session Pain Report  Pain Assessment  Pain Level: 0         OBJECTIVE        Bed mobility  Rolling to Right: Contact guard assistance  Supine to Sit: Contact guard assistance  Sit to Supine: Contact guard assistance  Scooting: Contact guard assistance  Comment: VC/TC's for proper technique. HOB flat, use of HR.     Transfers  Sit to Stand: Contact guard assistance  Stand to sit: Contact guard assistance  Comment: Pt pulls from [address] [place] [address] for support despite cues for proper hand placement. CGA for safety    Ambulation  Ambulation?: Yes  More Ambulation?: No  Ambulation 1  Device: Rolling Walker  Assistance: Contact guard assistance  Quality of Gait: shortened step length, decreased cleopatra heel strike and foot clearance, ff posture  Gait Deviations: Slow [place];Decreased step length;Decreased step height  Distance: 3 steps fwd / 3 steps retro    Stairs/Curb  Stairs?: No       ASSESSMENT   Assessment: Frequent vc/tc's needed throughout tx to improve over all quality and safety of bed mobility and trsfs. Pt is [place]. Displays poor carryover of cues for improved safety. Max encouragement needed throughout tx. Discharge Recommendations:  Continue to assess pending progress, Patient would benefit from continued therapy after discharge    Goals  Long term goals  Long term goal 1: Pt to complete bed mobility with supervision  Long term goal 2: Pt to complete transfers with SBA  Long term goal 3: Pt to ambulate 50ft with WW and CGA    PLAN    Times per week: 3-6  Plan Comment: Cont. POC  Safety Devices  Type of devices: All fall risk precautions in place, Bed alarm in place, Call light within reach, Left in bed     AMPA (6 CLICK) BASIC MOBILITY  AM-PAC Inpatient Mobility Raw Score : 17     Therapy Time   Individual   Time In 0950   Time Out 1003   Minutes 13      BM/Trsf: 10  Gait: 3       Meriel Bound, PTA, 06/17/21 at 10:10 AM         Definitions for assistance levels  Independent = pt does not require any physical supervision or assistance from another person for activity completion. Device may be needed.   Stand by assistance = pt requires verbal cues or instructions from another person, close to but not touching, to perform the activity  Minimal assistance= pt performs 75% or more of the activity; assistance is required to complete the activity  Moderate assistance= pt performs 50% of the activity; assistance is required to complete the activity  Maximal assistance = pt

## 2021-06-17 NOTE — CONSULTS
[address]                      [address], [address] [facility]                                  CONSULTATION    PATIENT NAME: Joey Craig                    :        1923  MED REC NO:   [id number]                            ROOM:       O569  ACCOUNT NO:   [de-identified]                           ADMIT DATE: 2021  PROVIDER:     Gualberto Grey MD    CONSULT DATE:  2021    REFERRING PROVIDER:  Roe Ty. DO Ruth    REASON FOR CONSULTATION:  ENT evaluation for right ear hearing loss and  impacted cerumen, evaluation for an ulcer in the right ear. HISTORY OF PRESENT ILLNESS:  This is a 55-year-old male who came to the  hospital with weakness and lethargy. The patient has history of BPH and  has indwelling Blanco catheter. He has a history of left ear cancer and  is status post left ear temporal bone resection and reconstruction. The  patient is a hearing aid user. He was going to be seen by another  physician who was going to do some biopsy; however, this was unable to  be done. PAST MEDICAL HISTORY:  Significant for cancer of the left ear, for  hypothyroidism, hypertension. MEDICATIONS:  The patient's medications were reviewed. PHYSICAL EXAMINATION:  GENERAL:  This is a 55-year-old male who is in his bed and appears  comfortable. VITAL SIGNS:  Text. HEENT:  Right ear auricle no lesion. Ear canal shows a deformity  related to deformity of bone and there are ulcers. Cerumen is noted and  there is an area of erythema noted. Left ear status post temporal bone  resection with reconstruction. Nose, no external nasal deformity,  anterior rhinoscopy, airway is patent. Oral cavity, no mucosal lesions  noted. Oropharynx is clear. NECK:  No obvious masses. IMPRESSION:  1. Hearing loss right ear with hearing aid use. 2.  Impacted cerumen and possible ulcer, right ear. 3.  Hypothyroidism.   4.  Status post resection of the left ear cancer and reconstruction. PLAN:  I will perform cleaning of the ear with the use of microscope  tomorrow and go from there. He may need some ear drops for few days. Thank you Dr. Xena Gagnon for this consult.         Teena Kay MD    D: 06/17/2021 12:06:35       T: 06/17/2021 13:23:15     [id number]  Job#: [id number]     Doc#: [id number]    CC:

## 2021-06-18 NOTE — PROGRESS NOTES
0830 am assessment completed as noted. Vss, will continue to monitor. 1305 Report called to Britatny Griffith. Pt in bed resting no complaints at this time. Electronically signed by Christine Mascorro RN on 6/18/2021 at 1:11 PM  1415 Updated son Florencio Lin that pt is being transferred to [facility] today. Electronically signed by Christine Mascorro RN on 6/18/2021 at 2:17 PM   [facility] to go to [facility].  Electronically signed by Christine Mascorro RN on 6/18/2021 at 4:00 PM

## 2021-06-18 NOTE — PROGRESS NOTES
Physical Therapy Med Surg Daily Treatment Note  Facility/Department: [facility]  Room: W397/M024-60       NAME: Mich Born  : 1923 (80 y.o.)  MRN: [id number]  CODE STATUS: Full Code    Date of Service: 2021    Patient Diagnosis(es): Septicemia [facility]) [A41.9]   Chief Complaint   Patient presents with    Fatigue     Patient Active Problem List    Diagnosis Date Noted    Septicemia ([facility] 2021    Encephalopathy 2021    Iron deficiency anemia 2020    Blanco catheter in place 2020    Hyperkalemia 2020    Onychomycosis 2020    Absolute anemia 2020    Difficulty in walking 2020    Muscle weakness (generalized) 2020    Urinary retention     Gross hematuria     Acute renal failure (ARF) ([facility] 2019        Past Medical History:   Diagnosis Date    BPH (benign prostatic hyperplasia)     Cancer ([facility]     skin CA left ear    Hypertension     Hypothyroidism     Urinary retention      Past Surgical History:   Procedure Laterality Date    EAR SURGERY      Left ear removed       Restrictions  Restrictions/Precautions: Fall Risk    SUBJECTIVE   General  Chart Reviewed: Yes  Family / Caregiver Present: No  Subjective  Subjective: \"I can try. \"    Pre-Session Pain Report  Pre Treatment Pain Screening  Pain at present: 0  Scale Used: Numeric Score  Intervention List: Patient able to continue with treatment  Pain Screening  Patient Currently in Pain: No       Post-Session Pain Report  Pain Assessment  Pain Assessment: 0-10  Pain Level: 0         OBJECTIVE        Bed mobility  Supine to Sit: Minimal assistance  Sit to Supine: Contact guard assistance  Comment: increased time and effort, vc's for proper technique. Transfers  Sit to Stand: Minimal Assistance  Stand to sit: Minimal Assistance  Comment: Pt pulls from [facility] for support despite cues for proper hand placement.  pt with R lean upon initial stand, improved with time.    Ambulation  Ambulation?: No (unsafe this date.)               Activity Tolerance  Activity Tolerance: Patient limited by cognitive status          ASSESSMENT   Assessment: pt confused, unsafe to attempt ambulation d/t unsteadiness in standing. Discharge Recommendations:  Continue to assess pending progress, Patient would benefit from continued therapy after discharge    Goals  Long term goals  Long term goal 1: Pt to complete bed mobility with supervision  Long term goal 2: Pt to complete transfers with SBA  Long term goal 3: Pt to ambulate 50ft with WW and CGA    PLAN    Times per week: 3-6  Safety Devices  Type of devices: All fall risk precautions in place, Bed alarm in place, Call light within reach, Left in bed     AMPAC (6 CLICK) BASIC MOBILITY  AM-PAC Inpatient Mobility Raw Score : 15      Therapy Time   Individual   Time In 1013   Time Out 1024   Minutes 11      BM/TrsF: [facility], [facility], 06/18/21 at 1:39 PM         Definitions for assistance levels  Independent = pt does not require any physical supervision or assistance from another person for activity completion. Device may be needed.   Stand by assistance = pt requires verbal cues or instructions from another person, close to but not touching, to perform the activity  Minimal assistance= pt performs 75% or more of the activity; assistance is required to complete the activity  Moderate assistance= pt performs 50% of the activity; assistance is required to complete the activity  Maximal assistance = pt performs 25% of the activity; assistance is required to complete the activity  Dependent = pt requires total physical assistance to accomplish the task

## 2021-06-18 NOTE — CARE COORDINATION
The LSW updated the supervisor at [facility] that the patient is a possible discharge for today. The RN is aware the patient will need a covid test prior to discharge. Electronically signed by HORACIO Castañeda on 6/18/21 at 10:20 AM EDT    The LSW set the discharge at 3 pm by cot to [facility]. The LSW updated Francesca LAMBERT, the patient's RN and the patient's son, Jc Prieto. The patient's son, Jc Prieto, verbally signed the IMM and expressed an understanding of the medicare appeal process.  Electronically signed by HORACIO Castañeda on 6/18/21 at 12:12 PM EDT

## 2021-06-18 NOTE — PROGRESS NOTES
Nephrology Progress Note    Assessment:  UTI treated  Failure to thrive  Ear cancer with resection left  Chronic esposito      Plan:d/c IV  Switch antibiotic to liquid   Discharge soon  Going to [facility]  Another 4-5 days of antibiotic  PT to continue    Patient Active Problem List:     Acute renal failure (ARF) (HCC)     Urinary retention     Gross hematuria     Absolute anemia     Difficulty in walking     Muscle weakness (generalized)     Hyperkalemia     Onychomycosis     Iron deficiency anemia     Esposito catheter in place     Encephalopathy     Septicemia ([address]      Subjective:  Admit Date: 6/12/2021    Interval History: more alert  [place] obviously    Medications:  Scheduled Meds:   cephALEXin  500 mg Oral 2 times per day    carbamide peroxide  3 drop Right Ear BID    levothyroxine  100 mcg Oral Daily    finasteride  5 mg Oral Daily    calcium-vitamin D  1 tablet Oral Daily    sodium chloride flush  5-40 mL Intravenous 2 times per day    enoxaparin  40 mg Subcutaneous Daily     Continuous Infusions:    CBC: No results for input(s): WBC, HGB, PLT in the last 72 hours. CMP:  No results for input(s): NA, K, CL, CO2, BUN, CREATININE, GLUCOSE, CALCIUM, LABGLOM in the last 72 hours. Troponin: No results for input(s): TROPONINI in the last 72 hours. BNP: No results for input(s): BNP in the last 72 hours. INR: No results for input(s): INR in the last 72 hours. Lipids: No results for input(s): CHOL, LDLDIRECT, TRIG, HDL, AMYLASE, LIPASE in the last 72 hours. Liver: No results for input(s): AST, ALT, ALKPHOS, PROT, LABALBU, BILITOT in the last 72 hours. Invalid input(s): BILDIR  Iron:  No results for input(s): IRONS, FERRITIN in the last 72 hours. Invalid input(s): LABIRONS  Urinalysis: No results for input(s): UA in the last 72 hours.     Objective:  Vitals: /75   Pulse (!) 34   Temp 98.2 °F (36.8 °C)   Resp 18   Ht 5' 6\" (1.676 m)   Wt 140 lb 1.6 oz (63.5 kg)   SpO2 94%   BMI 22.61 kg/m²    Wt

## 2021-06-19 PROBLEM — N39.0 UTI (URINARY TRACT INFECTION): Status: ACTIVE | Noted: 2021-01-01

## 2021-06-19 NOTE — H&P
Hospital Medicine History & Physical      PCP: Shaila Lee DO    Date of Admission: 6/18/2021    Date of Service: 6/19/21      Chief Complaint:  Weakness, confusion      History Of Present Illness:  80 y.o. male who presented to [facility] after acute admission at [facility] due to acute change in mental status, confusion, lethargy per family report. He was found to have chronically indwelled esposito cath, was diagnosed with UTI, UC grow pseudomona/klebsiella multi drug sensitive. After completing acute treatment he was transferred to [facility] for post acute rehabilitation. He was on clinimix IV, failed bedside swallow eval at [facility]. History was taking from previous admission since he is not providing meaningful history            Diagnosis Date    BPH (benign prostatic hyperplasia)     Cancer (HCC)     skin CA left ear    Hypertension     Hypothyroidism     Urinary retention        Past Surgical History:          Procedure Laterality Date    EAR SURGERY      Left ear removed       Medications Prior to Admission:      Prior to Admission medications    Medication Sig Start Date End Date Taking? Authorizing Provider   temazepam (RESTORIL) 15 MG capsule Take 15 mg by mouth nightly as needed for Sleep. Historical Provider, MD   zolpidem (AMBIEN) 10 MG tablet Take 10 mg by mouth nightly as needed for Sleep. Historical Provider, MD   finasteride (PROSCAR) 5 MG tablet Take 1 tablet by mouth daily 1/1/20   Radha Prater MD   tamsulosin [facility]) 0.4 MG capsule Take 1 capsule by mouth daily 1/1/20   Radha Prater MD   levothyroxine (SYNTHROID) 100 MCG tablet Take 100 mcg by mouth Daily    Historical Provider, MD       Allergies:  Patient has no known allergies. Social History:      The patient currently lives home    TOBACCO:   reports that he has never smoked. He has never used smokeless tobacco.  ETOH:   reports previous alcohol use.       Family History:       Reviewed in detail and negative for DM, reviewed the CXR with the following interpretation:   EKG:  I have reviewed the EKG with the following interpretation:     No orders to display       ASSESSMENT:    Active Hospital Problems    Diagnosis Date Noted    UTI (urinary tract infection) [N39.0] 06/19/2021       PLAN:        DVT Prophylaxis: lovenox  Diet: ADULT DIET; Dysphagia - Pureed; Mildly Thick (Nectar)  Code Status: Full Code    PT/OT Eval Status:     Dispo - UTI/sepsis- to complete atbs as ordered  BPH- esposito cath in place   Dysphagia- IV hydration, speech eval for further recommendations  Hypothyroidism- synthroid  Pt remained full code- will address with family today  Pt looks poor candidate for any acute rehabilitation, may needs SNF placement -social service on case  Medically stable for skilled status at [facility]    Thank you Coretta Dubose DO for the opportunity to be involved in this patient's care. If you have any questions or concerns please feel free to contact me.

## 2021-06-19 NOTE — PROGRESS NOTES
Attempted IV access on patient. He became combative, verbally aggressive, and uncooperative. Informed Dr. Arai Olguin and he said to leave the patient be. Will speak with son(s) when they get here and possibly attempt access again later. Will continue to monitor.

## 2021-06-19 NOTE — PROGRESS NOTES
Medical Progress Note    Assessment:  Dementia  Anemia  BPH chronic esposito  Treating UTI   Left ear carncer    Plan:feed patient watch for choking  Labs routinely  Sedation as needed    Patient Active Problem List:     Acute renal failure (ARF) ([address]     Urinary retention     Gross hematuria     Absolute anemia     Difficulty in walking     Muscle weakness (generalized)     Hyperkalemia     Onychomycosis     Iron deficiency anemia     Esposito catheter in place     Encephalopathy     Septicemia ([address]     UTI (urinary tract infection)      Subjective:  Admit Date: 6/18/2021    Interval History: moans    Medications:  Scheduled Meds:   enoxaparin  40 mg Subcutaneous Daily    calcium-cholecalciferol  1 tablet Oral Daily    carbamide peroxide  3 drop Right Ear BID    cephALEXin  500 mg Oral 2 times per day    finasteride  5 mg Oral Daily    levothyroxine  100 mcg Oral Daily     Continuous Infusions:   dextrose 5% in lactated ringers         CBC: No results for input(s): WBC, HGB, PLT in the last 72 hours. CMP:  No results for input(s): NA, K, CL, CO2, BUN, CREATININE, GLUCOSE, CALCIUM, LABGLOM in the last 72 hours. Troponin: No results for input(s): TROPONINI in the last 72 hours. BNP: No results for input(s): BNP in the last 72 hours. INR: No results for input(s): INR in the last 72 hours. Lipids: No results for input(s): CHOL, LDLDIRECT, TRIG, HDL, AMYLASE, LIPASE in the last 72 hours. Liver: No results for input(s): AST, ALT, ALKPHOS, PROT, LABALBU, BILITOT in the last 72 hours. Invalid input(s): BILDIR  Iron:  No results for input(s): IRONS, FERRITIN in the last 72 hours. Invalid input(s): LABIRONS  Urinalysis: No results for input(s): UA in the last 72 hours.     Objective:  Vitals: /69   Pulse 107   Temp 97.3 °F (36.3 °C)   Resp 18   Ht 5' 6\" (1.676 m)   Wt 139 lb (63 kg)   SpO2 95%   BMI 22.44 kg/m²    Wt Readings from Last 3 Encounters:   06/18/21 139 lb (63 kg)   06/18/21 140 lb 1.6 oz (63.5 kg)   02/16/21 140 lb (63.5 kg)      24HR INTAKE/OUTPUT:      Intake/Output Summary (Last 24 hours) at 6/19/2021 0597  Last data filed at 6/19/2021 0817  Gross per 24 hour   Intake 240 ml   Output --   Net 240 ml       General: alert, in no apparent distress  HEENT: normocephalic, atraumatic, anicteric  Neck: supple, no mass  Lungs: non-labored respirations, clear to auscultation bilaterally  Heart: regular rate and rhythm, no murmurs or rubs  Abdomen: soft, non-tender, non-distended  Ext: no cyanosis, no peripheral edema  Neuro: alert and oriented, no gross abnormalities  Psych: normal mood and affect  Skin: no rash      Electronically signed by Abbie Regan DO, MD

## 2021-06-19 NOTE — PROGRESS NOTES
Attempted to rouse pt x 2 for PT eval with no success despite verbal and tactile cues. Will attempt tomorrow as able.     Carlos Eduardo Lai PT [id number]

## 2021-06-19 NOTE — PROGRESS NOTES
SLP ATTEMPTED 3 TIMES TO ROUSE PATIENT FOR SWALLOW AND COGNITIVE EVALUATION AND WAS UNABLE TO DO SO IN SPITE OF VERBAL AND TACTILE CUES.

## 2021-06-20 NOTE — PLAN OF CARE
Problem: Skin Integrity:  Goal: Will show no infection signs and symptoms  Description: Will show no infection signs and symptoms  Outcome: Met This Shift  Goal: Absence of new skin breakdown  Description: Absence of new skin breakdown  Outcome: Met This Shift     Problem: Falls - Risk of:  Goal: Will remain free from falls  Description: Will remain free from falls  Outcome: Met This Shift  Goal: Absence of physical injury  Description: Absence of physical injury  Outcome: Met This Shift     Problem: Mental Status - Impaired:  Goal: Mental status will improve  Description: Mental status will improve  Outcome: Ongoing

## 2021-06-20 NOTE — PROGRESS NOTES
Pt refusing medication and will not allow staff to reposition at this time. Pt cusses at this nurse and states \"leave me alone. \" Will attempt to reapproach for repositioning and branden care.

## 2021-06-20 NOTE — PROGRESS NOTES
Physical, occupational and speech orders cancelled at this time. Pt to have hospice consult tomorrow. Will await further medical direction post hospice consult.      Carlie Rudd, [address]

## 2021-06-20 NOTE — PROGRESS NOTES
6/20/21 at 0848--Call from [facility] ([phone]) [address] [place] [address] with Hospice referral request per Dr. Esau Humphreys. States son Deja Juarez is primary contact at [phone]. Attempt call at [phone] and no answer. Call to [phone] cell [phone]; Hospice referral meeting offered between [phone] Mon 6/21/21. Eddie Gould states his brother Joe Sewell. Karlene Lorri is POA, but they are both off tomorrow so this will be fine. Inquired if sons would like call should there be a cancellation today, but Eddie Gould states tomorrow would be better.

## 2021-06-20 NOTE — PROGRESS NOTES
input(s): AST, ALT, BILIDIR, BILITOT, ALKPHOS in the last 72 hours. No results for input(s): INR in the last 72 hours. No results for input(s): Gomez Ang in the last 72 hours. Urinalysis:      Lab Results   Component Value Date    NITRU POSITIVE 06/12/2021    WBCUA >100 06/12/2021    BACTERIA MANY 06/12/2021    RBCUA 0-2 06/12/2021    BLOODU SMALL 06/12/2021    SPECGRAV 1.017 06/12/2021    GLUCOSEU Negative 06/12/2021       Radiology:  No orders to display           Assessment/Plan:    Active Hospital Problems    Diagnosis Date Noted    UTI (urinary tract infection) [N39.0] 06/19/2021         DVT Prophylaxis: lovenox  Diet: ADULT DIET;  Dysphagia - Pureed; Mildly Thick (Nectar)  Code Status: DNR-CC    PT/OT Eval Status: done    Dispo - UTI/sepsis- to complete atbs as ordered  BPH- esposito cath in place   Dysphagia-pt fed by his son today, refused PO in take by nurses   Hypothyroidism- synthroid  Pt looks poor candidate for any acute rehabilitation, discussed with both sons at bedside- code will be changed to DNR, hospice evaluation for further recommendations   Medically stable for skilled status at [facility]       Electronically signed by Lionel Paiz MD on 6/20/2021 at 8:25 AM

## 2021-06-20 NOTE — PROGRESS NOTES
Pt does allow this nurse to reposition at this time. Calling out for son to come and get him. Unable to redirect at this time pt made comfortable in bed.

## 2021-06-21 NOTE — PROGRESS NOTES
Pt appeared restless throughout the night, continuing to call out for [employer]. This nurse attempted to calm pt multiple times during night. Pt refusing morning medication at this time.

## 2021-06-21 NOTE — PROGRESS NOTES
0845- Pt extremely [place] but able to respond to voice. Pt refuses AM medications at this time. Pt denies pain Will try again later. 1000- Hospice RN at bedside for hospice meeting with pt and family  18- Pt able to eat a few bites of lunch with max assist and cues from this RN to swallow. Pt is confused but cooperative at this time  1330- Pt to transfer to [facility] later this afternoon. YOSEPH and discharge instructions complete  1625- [facility] here to  pt. Pt left unit via cot with [facility]. This RN called [phone] to inform of transfer. Pts son Xavier Whitt updated of pts transfer.

## 2021-06-21 NOTE — PROGRESS NOTES
Chart reviewed. Patient's care discussed in daily quality rounds. Patient is noted to be confused. Hospitalist met with patient's two adult sons whom are noted to be agreeable with hospice services. Patient is a [facility]. Patient's two adult sons are reported to have met with [facility] this am.  This  attempted to meet with patient and sons. Hospice nurse Neville Christianson reports that son's left hospital.  Neville Christianson reports that sons are agreeable with hospice services and signed consent. Neville Christianson reports that plan is for patient to go to [facility] upon DC from [facility]. Neville Christianson reports that sons are requesting to be notified once plan is arranged. Neville Christianson reports plan to follow up with sons. YOSEPH completed to reflect DC plan. SS to follow as needed while patient is at [facility].

## 2021-06-21 NOTE — PROGRESS NOTES
Pt laying in bed calling out to [place] and Tammie Gonzalez saying \"come pick me up\". Pt refuses to allow this nurse to assess or take vitals on him. Pt refuses Keflex and \"anything else you want to give me\". Pt states he is comfortable and to leave him alone. Will continue to monitor.

## 2021-06-21 NOTE — CONSULTS
1302: Referral meeting completed with son/HCPOA Mike Ziegler, and son Jane Zendejas. Patient was present, alert but oriented to self only, did not participate in meeting. Consents signed. Appropriateness obtained from Dr. Xavi Solano. Medications reviewed and reconciled with Dr. Xavi Solano. Family wants patient to go to hospice center for hospice services. TO received from Dr. Xavi Solano for patient to be transferred to hospice center today, 6/21/21, under routine level of care. [facility] called and transport set up for 1600 today 6/21/21. Mike Ziegler, patient son, called and updated on plan of care. Per family  will be coming at 1400 to anoint patient. Rocio Grove RN caring for patient today updated on plan of care. Ambulance form completed and placed in patients chart. Ruma Ulloa RN, informed to call [facility] center when patient is picked up by [facility]. Please call [facility] with any further questions or concerns.

## 2021-06-21 NOTE — PROGRESS NOTES
INTAKE/OUTPUT:      Intake/Output Summary (Last 24 hours) at 6/21/2021 1412  Last data filed at 6/21/2021 1059  Gross per 24 hour   Intake 60 ml   Output --   Net 60 ml       General:poorly alert, in no apparent distress  HEENT: normocephalic, atraumatic, anicteric  Neck: supple, no mass removed left ear  Lungs: non-labored respirations, clear to auscultation bilaterally  Heart: regular rate and rhythm, no murmurs or rubs  Abdomen: soft, non-tender, non-distended  Ext: no cyanosis, no peripheral edema ms wasting  Neuro: alert and oriented, no gross abnormalities  Psych: normal mood and affect  Skin: no rash      Electronically signed by Sara Diop DO, MD

## 2021-06-21 NOTE — DISCHARGE SUMMARY
Discharge Summary    Patient:  Devon Craven  YOB: 1923    MRN: [id number]   Acct: [de-identified]    Primary Care Physician: Sushil Al DO    Admit date:  6/18/2021    Discharge date:   06/21/21      Discharge Diagnoses:   <principal problem not specified>  Active Problems:    UTI (urinary tract infection)  Resolved Problems:    * No resolved hospital problems. *      Admitted for: [facility] Course: 80 y.o. male who presented to [facility] after acute admission at [facility] due to acute change in mental status, confusion, lethargy. He was found to have chronically indwelled esposito cath, was diagnosed with UTI, UC grow pseudomona/klebsiella multi drug sensitive. After completing acute treatment he was transferred to [facility] for post acute rehabilitation. He was on clinimix IV, failed bedside swallow eval at [facility]. During his stay at [facility] he refused all interventions, PO food or medications, PT. Discussed with both sons at bedside and they accepted Hospice approach.  After completing his stay at [facility] he will be DC under hospice service for further management      Consultants:  hospice    Discharge Medications:       Medication List      START taking these medications    cephALEXin 250 MG/5ML suspension  Commonly known as: KEFLEX  Take 10 mLs by mouth every 12 hours for 10 days        CONTINUE taking these medications    finasteride 5 MG tablet  Commonly known as: PROSCAR  Take 1 tablet by mouth daily     levothyroxine 100 MCG tablet  Commonly known as: SYNTHROID     tamsulosin 0.4 MG capsule  Commonly known as: FLOMAX  Take 1 capsule by mouth daily     temazepam 15 MG capsule  Commonly known as: RESTORIL     zolpidem 10 MG tablet  Commonly known as: AMBIEN           Where to Get Your Medications      These medications were sent to [facility] [address] - [address] [address]    Phone: [phone]   · cephALEXin 250 MG/5ML suspension         Physical Exam:    Vitals:  Vitals:    06/18/21 1708 06/18/21 1730 06/19/21 0530 06/21/21 0630   BP: (!) 139/91  126/69 139/78   Pulse: 88  107 73   Resp:   18    Temp:   97.3 °F (36.3 °C)    TempSrc:       SpO2:   95% 94%   Weight:  139 lb (63 kg)     Height:  5' 6\" (1.676 m)       Weight: Weight: 139 lb (63 kg)     24 hour intake/output:    Intake/Output Summary (Last 24 hours) at 6/21/2021 0934  Last data filed at 6/20/2021 1842  Gross per 24 hour   Intake 0 ml   Output 1450 ml   Net -1450 ml       General appearance - in no distress  Chest - clear to auscultation, no wheezes, rales or rhonchi, symmetric air entry  Heart - normal rate, regular rhythm, normal S1, S2, no murmurs, rubs, clicks or gallops  Abdomen - soft, nontender, nondistended, no masses or organomegaly  Obese: No; Protuberant: No   Neurological - refused to talk, looks sleepy   Extremities - peripheral pulses normal, no pedal edema, no clubbing or cyanosis  Skin - normal coloration and turgor, no rashes, no suspicious skin lesions noted        Radiology reports as per the Radiologist  Radiology: No results found. Results for orders placed or performed during the hospital encounter of 06/12/21   Culture, Blood 2    Specimen: Blood   Result Value Ref Range    Culture, Blood 2 No growth after 5 days of incubation. Culture, Blood 1    Specimen: Blood   Result Value Ref Range    Blood Culture, Routine No growth after 5 days of incubation.     COVID-19, Rapid    Specimen: Nasopharyngeal Swab   Result Value Ref Range    SARS-CoV-2, NAAT Not Detected Not Detected   Culture, Urine    Specimen: Urine, clean catch   Result Value Ref Range    Organism Pseudomonas aeruginosa (A)     Urine Culture, Routine >100,000 CFU/ml     Organism Klebsiella oxytoca (A)     Urine Culture, Routine >100,000 CFU/ml        Susceptibility    Klebsiella oxytoca - BACTERIAL SUSCEPTIBILITY PANEL BY NEFTALI Total Protein 7.5 6.3 - 8.0 g/dL    Albumin 3.7 3.5 - 4.6 g/dL    Total Bilirubin 0.8 (H) 0.2 - 0.7 mg/dL    Alkaline Phosphatase 106 (H) 35 - 104 U/L    ALT 9 0 - 41 U/L    AST 19 0 - 40 U/L    Globulin 3.8 (H) 2.3 - 3.5 g/dL   Lactate, Sepsis   Result Value Ref Range    Lactic Acid, Sepsis 2.6 (H) 0.5 - 1.9 mmol/L   Lipase   Result Value Ref Range    Lipase 14 12 - 95 U/L   Magnesium   Result Value Ref Range    Magnesium 1.8 1.7 - 2.4 mg/dL   Procalcitonin   Result Value Ref Range    Procalcitonin 0.11 0.00 - 0.15 ng/mL   Protime-INR   Result Value Ref Range    Protime 15.6 (H) 12.3 - 14.9 sec    INR 1.2    TSH without Reflex   Result Value Ref Range    TSH 0.845 0.440 - 3.860 uIU/mL   Troponin   Result Value Ref Range    Troponin 0.063 (HH) 0.000 - 0.010 ng/mL   Urinalysis Reflex to Culture    Specimen: Urine, indwelling catheter   Result Value Ref Range    Color, UA Yellow Straw/Yellow    Clarity, UA TURBID (A) Clear    Glucose, Ur Negative Negative mg/dL    Bilirubin Urine Negative Negative    Ketones, Urine Negative Negative mg/dL    Specific Gravity, UA 1.017 1.005 - 1.030    Blood, Urine SMALL (A) Negative    pH, UA 7.5 5.0 - 9.0    Protein, UA >=300 (A) Negative mg/dL    Urobilinogen, Urine 1.0 <2.0 E.U./dL    Nitrite, Urine POSITIVE (A) Negative    Leukocyte Esterase, Urine LARGE (A) Negative    Urine Reflex to Culture Yes    Microscopic Urinalysis   Result Value Ref Range    RBC, UA 0-2 0 - 2 /HPF    Bacteria, UA MANY (A) Negative /HPF    WBC, UA >100 (H) 0 - 5 /HPF    Epithelial Cells, UA 0-2 0 - 5 /HPF   Lactic Acid, Plasma   Result Value Ref Range    Lactic Acid 2.3 (H) 0.5 - 2.2 mmol/L   Basic Metabolic Panel   Result Value Ref Range    Sodium 136 135 - 144 mEq/L    Potassium 4.2 3.4 - 4.9 mEq/L    Chloride 103 95 - 107 mEq/L    CO2 22 20 - 31 mEq/L    Anion Gap 11 9 - 15 mEq/L    Glucose 89 70 - 99 mg/dL    BUN 31 (H) 8 - 23 mg/dL    CREATININE 0.85 0.70 - 1.20 mg/dL    GFR Non-African American >60.0 >60    GFR  >60.0 >60    Calcium 8.8 8.5 - 9.9 mg/dL   PROCALCITONIN   Result Value Ref Range    Procalcitonin 0.80 (H) 0.00 - 0.15 ng/mL   Lactic Acid, Plasma   Result Value Ref Range    Lactic Acid 0.8 0.5 - 2.2 mmol/L   Electrophoresis Protein, Serum with Reflex to Immunofixation   Result Value Ref Range    SPE/KAELYN Interpretation See Note     Albumin 3.00 (L) 3.75 - 5.01 g/dL    Alpha-1-Globulin 0.39 0.19 - 0.46 g/dL    Alpha-2-Globulin 0.62 0.48 - 1.05 g/dL    Beta Globulin 0.94 0.48 - 1.10 g/dL    Gamma Globulin 1.55 (H) 0.62 - 1.51 g/dL    Immunofixation Reflex Not Done     Total Protein 6.5 6.3 - 8.2 g/dL   CBC Auto Differential   Result Value Ref Range    WBC 5.5 4.8 - 10.8 K/uL    RBC 2.34 (L) 4.70 - 6.10 M/uL    Hemoglobin 8.2 (L) 14.0 - 18.0 g/dL    Hematocrit 23.7 (L) 42.0 - 52.0 %    .9 (H) 80.0 - 100.0 fL    MCH 35.0 (H) 27.0 - 31.3 pg    MCHC 34.7 33.0 - 37.0 %    RDW 14.4 11.5 - 14.5 %    Platelets 500 924 - 454 K/uL    Neutrophils % 71.8 %    Lymphocytes % 18.1 %    Monocytes % 8.6 %    Eosinophils % 0.8 %    Basophils % 0.7 %    Neutrophils Absolute 3.9 1.4 - 6.5 K/uL    Lymphocytes Absolute 1.0 1.0 - 4.8 K/uL    Monocytes Absolute 0.5 0.2 - 0.8 K/uL    Eosinophils Absolute 0.0 0.0 - 0.7 K/uL    Basophils Absolute 0.0 0.0 - 0.2 K/uL   Comprehensive Metabolic Panel   Result Value Ref Range    Sodium 135 135 - 144 mEq/L    Potassium 4.1 3.4 - 4.9 mEq/L    Chloride 99 95 - 107 mEq/L    CO2 24 20 - 31 mEq/L    Anion Gap 12 9 - 15 mEq/L    Glucose 119 (H) 70 - 99 mg/dL    BUN 29 (H) 8 - 23 mg/dL    CREATININE 0.67 (L) 0.70 - 1.20 mg/dL    GFR Non-African American >60.0 >60    GFR  >60.0 >60    Calcium 8.2 (L) 8.5 - 9.9 mg/dL    Total Protein 6.6 6.3 - 8.0 g/dL    Albumin 2.8 (L) 3.5 - 4.6 g/dL    Total Bilirubin 0.4 0.2 - 0.7 mg/dL    Alkaline Phosphatase 74 35 - 104 U/L    ALT 7 0 - 41 U/L    AST 13 0 - 40 U/L    Globulin 3.8 (H) 2.3 - 3.5 g/dL   EKG 12 Lead Result Value Ref Range    Ventricular Rate 91 BPM    Atrial Rate 91 BPM    P-R Interval 206 ms    QRS Duration 96 ms    Q-T Interval 364 ms    QTc Calculation (Bazett) 447 ms    P Axis 7 degrees    R Axis -33 degrees    T Axis 77 degrees   EKG 12 Lead   Result Value Ref Range    Ventricular Rate 59 BPM    Atrial Rate 250 BPM    QRS Duration 102 ms    Q-T Interval 456 ms    QTc Calculation (Bazett) 451 ms    R Axis -29 degrees    T Axis 32 degrees       Diet:  ADULT DIET;  Dysphagia - Pureed; Mildly Thick (Nectar)    Activity:  Activity as tolerated (Patient may move about with assist as indicated or with supervision.)    Follow-up:  in 1 weeks with Sandra Abreu DO,      Disposition: hospice    Condition: Unstable    Time Spent: 40 minutes    Electronically signed by Minerva Regan MD on 6/21/2021 at 9:34 AM    Discharging Hospitalist

## 2021-07-06 NOTE — DISCHARGE SUMMARY
[address]                      [address], [address] [facility]                               DISCHARGE SUMMARY    PATIENT NAME: Lynette Purdy                    :        1923  MED REC NO:   [id number]                            ROOM:       [id number]  ACCOUNT NO:   [de-identified]                           ADMIT DATE: 2021  PROVIDER:     Coretta Dubose DO                 [facility] DATE: 2021    HISTORY OF PRESENT ILLNESS:  A 77-year-old cachectic male admitted to  the hospital for failure to thrive. The patient has been feeling  lethargic. He has history of left ear cancer and has been removed with  subsequent plastic surgery. He has been stable with this issue for  years. The patient has known hypertension and hypothyroidism. The  patient's sons states that he has been confused, weak, and not eating  properly. He was admitted to the hospital for evaluation and treatment. The patient had issues with swallowing. It is considered, however, due  to his age and overall poor health sons refused this idea. He was given  hydration. The patient was unable to improve. A Blanco catheter was  changed while he was in the hospital.  Naga Francis agreed on sending him to  palliative care at [facility] in hopes that he might improve with reality that  the patient was in his latter days. DISCHARGE DIAGNOSES:  1. Failure to thrive. 2.  Left ear cancer, stable. 3.  Dementia. 4.  Dysphagia. 5.  Chronic indwelling Blanco catheter. 6.  Hypertension. 7.  Hypothyroidism. The patient is now with vital signs stable.         Steven Miller DO    D: 2021 11:56:44       T: 2021 13:11:10     [id number]  Job#: [id number]     Doc#: [id number]    CC:

## 2023-03-18 NOTE — DISCHARGE INSTR - COC
Continuity of Care Form    Patient Name: Lesley Celestin   :  1923  MRN:  [id number]    Admit date:  2021  Discharge date:  2021    Code Status Order: [facility]   Advance Directives:   [facility] Documentation     Date/Time Healthcare Directive Type of Healthcare Directive Copy in [address] [facility] [address] Agent's Name Healthcare Agent's Phone Number    21 [phone]  Yes, patient has an advance directive for healthcare treatment -- -- -- -- --          Admitting Physician:  Josh Barth MD  PCP: Brittney Deng DO    Discharging Nurse: [facility] Unit/Room#: 0226/0226-01  Discharging Unit Phone Number: [phone]    Emergency Contact:   Extended Emergency Contact Information  Primary Emergency Contact:  Renetta Garcia Phone: [phone]  Mobile Phone: [phone]  Relation: None  Secondary Emergency Contact: Mathieu Chase, [address] [place] [address] Phone: [phone]  Mobile Phone: [phone]  Relation: Child    Past Surgical History:  Past Surgical History:   Procedure Laterality Date    EAR SURGERY      Left ear removed       Immunization History:   Immunization History   Administered Date(s) Administered    Influenza, Quadv, IM, PF (6 mo and older Fluzone, Flulaval, Fluarix, and 3 yrs and older Afluria) 2020       Active Problems:  Patient Active Problem List   Diagnosis Code    Acute renal failure (ARF) ([facility] N17.9    Urinary retention R33.9    Gross hematuria R31.0    Absolute anemia D64.9    Difficulty in walking R26.2    Muscle weakness (generalized) M62.81    Hyperkalemia E87.5    Onychomycosis B35.1    Iron deficiency anemia D50.9    Blanco catheter in place Z97.8    Encephalopathy G93.40    Septicemia ([facility] A41.9    UTI (urinary tract infection) N39.0       Isolation/Infection:   Isolation          No Isolation        Patient Infection Status     None to display          Nurse Assessment:  Last Vital Signs: /78   Pulse 73   Temp 97.3 °F (36.3 °C)   Resp 18   Ht 5' 6\" (1.676 m)   Wt 139 lb (63 kg)   SpO2 94%   BMI 22.44 kg/m²     Last documented pain score (0-10 scale): Pain Level: 1  Last Weight:   Wt Readings from Last 1 Encounters:   06/18/21 139 lb (63 kg)     Mental Status:  disoriented    IV Access:  - None    Nursing Mobility/ADLs:  Walking   Dependent  Transfer  Dependent  Bathing  Dependent  Dressing  Dependent  Toileting  Dependent  Feeding  Dependent  Med Admin  Dependent  Med Delivery   crushed    Wound Care Documentation and Therapy:  Wound Sacrum stage 1 asessed with Bernice Hyman (Active)   Wound Etiology Pressure Stage  1 06/21/21 1222   Dressing Status Clean;Dry; Intact 06/21/21 1222   Wound Cleansed Not Cleansed 06/21/21 1222   Dressing/Treatment Foam 06/21/21 1222   Offloading for Diabetic Foot Ulcers No offloading required 06/21/21 1222   Dressing Change Due 06/24/21 06/21/21 1222   Wound Assessment Dry;Pink/red;Non-blanchable erythema 06/21/21 1222   Drainage Amount None 06/21/21 1222   Odor None 06/21/21 1222   Katarina-wound Assessment Blanchable erythema;Dry/flaky; Intact; Warm 06/21/21 1222   Number of days:         Elimination:  Continence:   · Bowel: No  · Bladder: No  Urinary Catheter: Indication for Use of Catheter: Urology/Urologist seeing this patient or inserted indwelling catheter   Colostomy/Ileostomy/Ileal Conduit: No       Date of Last BM: 6/18/2021    Intake/Output Summary (Last 24 hours) at 6/21/2021 1226  Last data filed at 6/21/2021 1059  Gross per 24 hour   Intake 60 ml   Output --   Net 60 ml     I/O last 3 completed shifts: In: 61 [P.O.:60]  Out: 1450 [Urine:1450]    Safety Concerns:      At Risk for Falls and Aspiration Risk    Impairments/Disabilities:      Speech, Vision and Hearing    Nutrition Therapy:  Current Nutrition Therapy:   - Oral Diet:  Dysphagia 1 pureed    Routes of Feeding: Oral  Liquids: Nectar Thick Liquids  Daily Fluid Restriction: no  Last Modified Barium Swallow with Video (Video Swallowing Test): not done    Treatments at the Time of Hospital Discharge:   Respiratory Treatments: ***  Oxygen Therapy:  is on oxygen at 2 L/min per nasal cannula. Ventilator:    - No ventilator support    Rehab Therapies: Physical Therapy  Weight Bearing Status/Restrictions: No weight bearing restirctions  Other Medical Equipment (for information only, NOT a DME order):  hospital bed  Other Treatments: ***    Patient's personal belongings (please select all that are sent with patient):  None    RN SIGNATURE:  Electronically signed by Denver Giraldo RN on 6/21/21 at 2:47 PM EDT    CASE MANAGEMENT/SOCIAL WORK SECTION    Inpatient Status Date: 6/18/21    Readmission Risk Assessment Score:  Readmission Risk              Risk of Unplanned Readmission:  13           [facility]/ [facility]   [facility]   [facility]   · Phone: [phone]    / signature: Electronically signed by HORACIO Vickers on 6/21/2021 at 12:27 PM      PHYSICIAN SECTION    Prognosis: {Prognosis:5127827982}    Condition at Discharge: [facility] Patient Condition:727375898}    Rehab Potential (if transferring to Rehab): {Prognosis:0312370840}    Recommended Labs or Other Treatments After Discharge: ***    Physician Certification: I certify the above information and transfer of Lesley Celestin  is necessary for the continuing treatment of the diagnosis listed and that he requires {Admit to Appropriate Level of Care:81343} for {GREATER/LESS:442600575} 30 days.      Update Admission H&P: {CHP DME Changes in GPP:795055698}    PHYSICIAN SIGNATURE:  {Esignature:[id number]} Results in MD note